# Patient Record
Sex: MALE | Race: WHITE | Employment: FULL TIME | URBAN - METROPOLITAN AREA
[De-identification: names, ages, dates, MRNs, and addresses within clinical notes are randomized per-mention and may not be internally consistent; named-entity substitution may affect disease eponyms.]

---

## 2019-12-03 ENCOUNTER — HOSPITAL ENCOUNTER (OUTPATIENT)
Dept: CT IMAGING | Age: 54
Discharge: HOME | End: 2019-12-03
Attending: ORTHOPAEDIC SURGERY

## 2019-12-03 ENCOUNTER — HOSPITAL ENCOUNTER (OUTPATIENT)
Dept: PREADMISSION TESTING | Age: 54
Discharge: HOME | End: 2019-12-03
Attending: ORTHOPAEDIC SURGERY

## 2019-12-03 VITALS
SYSTOLIC BLOOD PRESSURE: 150 MMHG | RESPIRATION RATE: 16 BRPM | HEART RATE: 103 BPM | HEIGHT: 72 IN | OXYGEN SATURATION: 98 % | BODY MASS INDEX: 32.22 KG/M2 | WEIGHT: 237.88 LBS | DIASTOLIC BLOOD PRESSURE: 71 MMHG | TEMPERATURE: 98.1 F

## 2019-12-03 DIAGNOSIS — R73.09 ELEVATED HEMOGLOBIN A1C: Primary | ICD-10-CM

## 2019-12-03 DIAGNOSIS — M25.551 RIGHT HIP PAIN: ICD-10-CM

## 2019-12-03 DIAGNOSIS — M16.11 PRIMARY OSTEOARTHRITIS OF RIGHT HIP: ICD-10-CM

## 2019-12-03 RX ORDER — METFORMIN HYDROCHLORIDE 1000 MG/1
1000 TABLET ORAL 2 TIMES DAILY WITH MEALS
COMMUNITY

## 2019-12-03 NOTE — PERIOP NOTES
Preventing Infections Before and After - Your Surgery    IMPORTANT INSTRUCTIONS    Please read and follow these instructions carefully. If you are unable to comply with the below instructions your procedure will be cancelled. Every Night for Three (3) nights before your surgery:  1. Shower with an antibacterial soap, such as Dial, or the soap provided at your preassessment appointment. A shower is better than a bath for cleaning your skin. 2. If needed, ask someone to help you reach all areas of your body. Dont forget to clean your belly button with every shower. The night before your surgery: If you lose your Hibiclens/chlorhexidine please contact surgery center or you can purchase it at a local pharmacy  1. On the night before your surgery, shower with an antibacterial soap, such as Dial, or the soap provided at your preassessment appointment. 2. With one packet of Hibiclens/Chlorhexidine in hand, turn water off.  3. Apply Hibiclens antiseptic skin cleanser with a clean, freshly washed washcloth. ? Gently apply to your body from chin to toes (except the genital area) and especially the area(s) where your incision(s) will be. ? Leave Hibiclens/Chlorhexidine on your skin for at least 20 seconds. CAUTION: If needed, Hibiclens/chlorhexidine may be used to clean the folds of skin of the legs (such as in the area of the groin) and on your buttocks and hips. However, do not use Hibiclens/Chlorhexidine above the neck or in the genital area (your bottom) or put inside any area of your body. 4. Turn the water back on and rinse. 5. Dry gently with a clean, freshly washed towel. 6. After your shower, do not use any powder, deodorant, perfumes or lotion. 7. Use clean, freshly washed towels and washcloths every time you shower. 8. Wear clean, freshly washed pajamas to bed the night before surgery. 9. Sleep on clean, freshly washed sheets. 10. Do not allow pets to sleep in your bed with you.     The Morning of your surgery:  1. Shower again thoroughly with an antibacterial soap, such as Dial or the soap provided at your preassessment appointment. If needed, ask someone for help to reach all areas of your body. Dont forget to clean your belly button! Rinse. 2. Dry gently with a clean, freshly washed towel. 3. After your shower, do not use any powder, deodorant, perfumes or lotion prior to surgery. 4. Put on clean, freshly washed clothing. Tips to help prevent infections after your surgery:  1. Protect your surgical wound from germs:  ? Hand washing is the most important thing you and your caregivers can do to prevent infections. ? Keep your bandage clean and dry! ? Do not touch your surgical wound. 2. Use clean, freshly washed towels and washcloths every time you shower; do not share bath linens with others. 3. Until your surgical wound is healed, wear clothing and sleep on bed linens each day that are clean and freshly washed. 4. Do not allow pets to sleep in your bed with you or touch your surgical wound. 5. Do not smoke - smoking delays wound healing. This may be a good time to stop smoking. 6. If you have diabetes, it is important for you to manage your blood sugar levels properly before your surgery as well as after your surgery. Poorly managed blood sugar levels slow down wound healing and prevent you from healing completely. If you lose your Hibiclens/chlorhexidine, please call the [facility], or it is available for purchase at your pharmacy.                ___________________      ___________________      ________________  (Signature of Patient)          (Witness)                   (Date and Time)

## 2019-12-03 NOTE — PERIOP NOTES

## 2019-12-03 NOTE — PERIOP NOTES
Called Dr Anne Bañuelos office and requested recent labs to be faxed. CBC, CMP, and HGB A1C results from 11/27/2019 from PCP's office. No EKG available. HGB A1C- 10.1 from 11/27/2019 at PCP's office. Informed Bessie Schrader and she came in and talked to patient and informed Nicky Esquivel at Dr Boyd Welsh. Per patient, he did not want to proceed to go to the joint replacement class at this time. Labs were discarded and never sent.

## 2019-12-03 NOTE — ADVANCED PRACTICE NURSE
A1C received from PCP office. A1C 10.1 on 11/27/19. States he is a known diabetic, treated by PCP, Dr. Rosanne Carr. Has not been evaluated by endocrinology. States his insurance company no longer covers his [place] [employer] and has not been able to afford it since then. He states he has tried alternate medications with palpitations resulting. Advised pt that surgery would likely be delayed due to A1C and that DTC consult would be ordered to optimize his glucose control. PC to Darshan Dunham, nurse for Dr. Stephanie Celestin, advising her of A1C results. States surgery would be delayed at this time. States she will contact CT to cancel pt's appt today and will reach out to pt this afternoon regarding further plan.      DTC consult ordered for A1C of 10.1 at PCP office

## 2021-06-04 ENCOUNTER — OFFICE VISIT (OUTPATIENT)
Dept: SURGERY | Age: 56
End: 2021-06-04
Payer: COMMERCIAL

## 2021-06-04 ENCOUNTER — TRANSCRIBE ORDER (OUTPATIENT)
Dept: SCHEDULING | Age: 56
End: 2021-06-04

## 2021-06-04 VITALS
HEIGHT: 72 IN | TEMPERATURE: 98.9 F | HEART RATE: 85 BPM | DIASTOLIC BLOOD PRESSURE: 82 MMHG | OXYGEN SATURATION: 99 % | SYSTOLIC BLOOD PRESSURE: 122 MMHG | RESPIRATION RATE: 18 BRPM | BODY MASS INDEX: 31.82 KG/M2 | WEIGHT: 234.9 LBS

## 2021-06-04 DIAGNOSIS — F17.210 CIGARETTE SMOKER: ICD-10-CM

## 2021-06-04 DIAGNOSIS — M54.2 CERVICALGIA: Primary | ICD-10-CM

## 2021-06-04 DIAGNOSIS — E04.1 LEFT THYROID NODULE: Primary | ICD-10-CM

## 2021-06-04 PROCEDURE — 99204 OFFICE O/P NEW MOD 45 MIN: CPT | Performed by: SURGERY

## 2021-06-04 RX ORDER — LISINOPRIL 5 MG/1
TABLET ORAL DAILY
COMMUNITY

## 2021-06-04 RX ORDER — ATORVASTATIN CALCIUM 40 MG/1
TABLET, FILM COATED ORAL DAILY
COMMUNITY

## 2021-06-04 NOTE — LETTER
6/4/2021 Patient: Connie Current YOB: 1965 Date of Visit: 6/4/2021 Clinton Jackman MD 
[address] [facility] [address] [address] Via Fax: [phone] [facility] [address] Via Fax: [phone] Dear MD Alicia Golsdtein MD, Thank you for referring Mr. Diony Cadet Sondra to [address] [place] [address] for evaluation. My notes for this consultation are attached. If you have questions, please do not hesitate to call me. I look forward to following your patient along with you.  
 
 
Sincerely, 
 
Oksana Landaverde MD

## 2021-06-04 NOTE — PROGRESS NOTES
1. Have you been to the ER, urgent care clinic since your last visit? Hospitalized since your last visit? No    2. Have you seen or consulted any other health care providers outside of the [address] [place] [address] since your last visit? Include any pap smears or colon screening.  No

## 2021-06-04 NOTE — PROGRESS NOTES
HISTORY OF PRESENT ILLNESS  Balbina Cabral is a 48 y.o. male who comes in for consultation by Hipolito Shultz MD for thyroid problems  HPI  He has noted increasing dysphagia over the last 3 years. He has problems swallowing meat and pills at times. He also feels like he is choking when he flexes his neck. He has a remote hx neck trauma from a tubing injury with four fractured cervical vertebra per the patient. In 2018 he had a CT of the neck noting a 5 cm left thyroid nodule and biopsy was recommended but never done. It also noted anterior cervical osteophytosis with some mass effect on the hypopharynx and C5-6 moderate canal stenosis. He denies arm paresthesias/weakness or voice changes, palpitations, unexplained weight loss/gain. Past Medical History:   Diagnosis Date    Diabetes ([address]     High cholesterol     Hypertension     Sleep apnea     no cpap     Past Surgical History:   Procedure Laterality Date    HX ORTHOPAEDIC  01/2021    Rt Hip replacement [facility]     Family History   Problem Relation Age of Onset    Diabetes Mother     Diabetes Father     Heart Disease Father      Social History     Tobacco Use    Smoking status: Current Every Day Smoker     Packs/day: 1.00     Years: 40.00     Pack years: 40.00    Smokeless tobacco: Never Used   Substance Use Topics    Alcohol use: Yes     Comment: occasionally    Drug use: Yes     Frequency: 3.0 times per week     Types: Marijuana     Current Outpatient Medications   Medication Sig    lisinopriL (PRINIVIL, ZESTRIL) 5 mg tablet Take  by mouth daily.  atorvastatin (LIPITOR) 40 mg tablet Take  by mouth daily.  dapagliflozin (Farxiga) 10 mg tab tablet Take  by mouth daily.  metFORMIN (GLUCOPHAGE) 1,000 mg tablet Take 1,000 mg by mouth two (2) times daily (with meals). No current facility-administered medications for this visit.      No Known Allergies    Review of Systems   Constitutional: Negative for chills, diaphoresis, fever, malaise/fatigue and weight loss. HENT: Negative for congestion, ear pain and sore throat. Eyes: Negative for blurred vision and pain. Respiratory: Negative for cough, hemoptysis, sputum production, shortness of breath, wheezing and stridor. Cardiovascular: Negative for chest pain, palpitations, orthopnea, claudication, leg swelling and PND. Gastrointestinal: Negative for abdominal pain, blood in stool, constipation, diarrhea, heartburn, melena, nausea and vomiting. Genitourinary: Negative for dysuria, flank pain, frequency, hematuria and urgency. Musculoskeletal: Positive for back pain and myalgias. Negative for joint pain and neck pain. Skin: Negative for itching and rash. Neurological: Positive for sensory change (peripheral neuropathy in feet). Negative for dizziness, tremors, focal weakness, seizures, weakness and headaches. Endo/Heme/Allergies: Negative for polydipsia. Psychiatric/Behavioral: Negative for depression and memory loss. The patient is not nervous/anxious. Visit Vitals  /82 (BP 1 Location: Left arm, BP Patient Position: Sitting, BP Cuff Size: Adult)   Pulse 85   Temp 98.9 °F (37.2 °C) (Oral)   Resp 18   Ht 6' (1.829 m)   Wt 106.5 kg (234 lb 14.4 oz)   SpO2 99%   BMI 31.86 kg/m²       Physical Exam  Constitutional:       General: He is not in acute distress. Appearance: Normal appearance. He is well-developed. He is not diaphoretic. HENT:      Head: Normocephalic and atraumatic. Mouth/Throat:      Pharynx: No oropharyngeal exudate. Eyes:      General: No scleral icterus. Conjunctiva/sclera: Conjunctivae normal.      Pupils: Pupils are equal, round, and reactive to light. Neck:      Thyroid: Thyroid mass present. No thyromegaly. Trachea: Trachea and phonation normal. No tracheal deviation. Cardiovascular:      Rate and Rhythm: Normal rate and regular rhythm. Heart sounds: Normal heart sounds. No murmur heard.    No friction rub. No gallop. Pulmonary:      Effort: Pulmonary effort is normal. No respiratory distress. Breath sounds: Normal breath sounds. No stridor. No wheezing or rales. Abdominal:      General: Bowel sounds are normal. There is no distension. Palpations: Abdomen is soft. There is no mass. Tenderness: There is no abdominal tenderness. There is no guarding or rebound. Hernia: No hernia is present. There is no hernia in the left inguinal area. Genitourinary:     Penis: Circumcised. Testes: Normal. Cremasteric reflex is present. Musculoskeletal:         General: No tenderness. Normal range of motion. Cervical back: Full passive range of motion without pain, normal range of motion and neck supple. Lymphadenopathy:      Cervical: No cervical adenopathy. Right cervical: No superficial or deep cervical adenopathy. Left cervical: No superficial or deep cervical adenopathy. Skin:     General: Skin is warm and dry. Findings: No erythema or rash. Neurological:      Mental Status: He is alert and oriented to person, place, and time. Cranial Nerves: No cranial nerve deficit. Coordination: Coordination normal.   Psychiatric:         Behavior: Behavior normal.         Thought Content: Thought content normal.         Judgment: Judgment normal.         ASSESSMENT and PLAN  1. Left thyroid nodule I explained about the anatomy and pathophysiology of thyroid nodules/disease. I explained about possible malignancy. I discussed FNA, observation, possible thyroid suppression pending FNA, and total thyroidectomy.   Risks of surgery include bleeding (potentially requiring emergent exploration at bedside), infection, parathyroid injury/removal requiring supplemental calcium +/- vit d, recurrent laryngeal/superior laryngeal nerve injury resulting in vocal cord paralysis, voice changes and fatigue, aspiration, further surgery, need for lifelong thyroid supplementation. Will get US of the neck to assess the thyroid   Consider US FNA based on results  2. Dysphagia. ?due to #1 or cervical osteophytes. Depending upon the thyroid findings would consider referral to neck/spine specialist  3. New lung nodule on CXR. He has seen Dr Gross Leisure   A CT of the chest is ordered but has not been done yet  4. NIDDM type 2 with peripheral neuropathy  On rx  5. Essential hypertension. Stable on rx  6. Hypercholesterolemia. On statin  7. Tobacco use   1ppd x 35 years.     Recommended cessation    Walter Boyce MD FACS

## 2021-06-17 ENCOUNTER — HOSPITAL ENCOUNTER (OUTPATIENT)
Dept: ULTRASOUND IMAGING | Age: 56
Discharge: HOME | End: 2021-06-17
Attending: SURGERY
Payer: COMMERCIAL

## 2021-06-17 ENCOUNTER — HOSPITAL ENCOUNTER (OUTPATIENT)
Dept: CT IMAGING | Age: 56
Discharge: HOME | End: 2021-06-17
Attending: INTERNAL MEDICINE
Payer: COMMERCIAL

## 2021-06-17 DIAGNOSIS — E04.1 LEFT THYROID NODULE: ICD-10-CM

## 2021-06-17 DIAGNOSIS — F17.210 CIGARETTE SMOKER: ICD-10-CM

## 2021-06-17 DIAGNOSIS — M54.2 CERVICALGIA: ICD-10-CM

## 2021-06-17 PROCEDURE — 76536 US EXAM OF HEAD AND NECK: CPT

## 2021-06-17 PROCEDURE — 71250 CT THORAX DX C-: CPT

## 2021-06-18 ENCOUNTER — TELEPHONE (OUTPATIENT)
Dept: SURGERY | Age: 56
End: 2021-06-18

## 2021-06-18 NOTE — TELEPHONE ENCOUNTER
I have made pt appt for 7- to come in for CT results. Pt needs something sooner and as late as poss.

## 2021-06-21 ENCOUNTER — TRANSCRIBE ORDER (OUTPATIENT)
Dept: SCHEDULING | Age: 56
End: 2021-06-21

## 2021-06-21 DIAGNOSIS — R91.1 NODULE OF UPPER LOBE OF LUNG: Primary | ICD-10-CM

## 2021-06-21 NOTE — TELEPHONE ENCOUNTER
US demonstrates 6.2 cm cystic/solid left thyroid nodule with calcifications    This needs US FNA left thyroid nodule    He also has a lung nodule with mediastinal lymph node that needs f/u by Dr Rey Morales his pulmonologist      Yen  Please reach out to him to schedule US FNA for 7/9    Alf Ley MD FACS

## 2021-07-09 ENCOUNTER — OFFICE VISIT (OUTPATIENT)
Dept: SURGERY | Age: 56
End: 2021-07-09
Payer: COMMERCIAL

## 2021-07-09 VITALS
HEIGHT: 72 IN | HEART RATE: 96 BPM | TEMPERATURE: 97.3 F | WEIGHT: 236 LBS | RESPIRATION RATE: 16 BRPM | BODY MASS INDEX: 31.97 KG/M2 | DIASTOLIC BLOOD PRESSURE: 84 MMHG | SYSTOLIC BLOOD PRESSURE: 134 MMHG

## 2021-07-09 DIAGNOSIS — E04.1 LEFT THYROID NODULE: ICD-10-CM

## 2021-07-09 PROCEDURE — 10005 FNA BX W/US GDN 1ST LES: CPT | Performed by: SURGERY

## 2021-07-09 NOTE — PROGRESS NOTES
Procedure Note    Pre Procedure Diagnosis:  Left thyroid complex nodule  Post Procedure Diagnosis:  Left thyroid complex nodule  Procedure:  1. Ultrasound guided FNA of left thyroid nodule  Surgeon:   Katelynn Harrison MD FACS  EBL minimal  SPECIMEN:  Left thyroid nodule    Procedure:    After informed consent and time out, I utilized a [id number] [address] with a high frequency linear array transducer and two 22 and two 25 gauge needle to perform four passes through a 2.5 x 2.4 x 5.2 cm complex cystic and solid left thyroid nodule. Specimens were placed in specialized containers from [facility]. He tolerated it well.         Signed  Katelynn Harrison MD FACS

## 2021-07-09 NOTE — PROGRESS NOTES
[facility]  OFFICE PROCEDURE PROGRESS NOTE        Chart reviewed for the following:   Lisa GONZALEZ, have reviewed the History, Physical and updated the Allergic reactions for [employer]     TIME OUT performed immediately prior to start of procedure:   Lisa GONZALEZ, have performed the following reviews on [employer] prior to the start of the procedure:            * Patient was identified by name and date of birth   * Agreement on procedure being performed was verified  * Risks and Benefits explained to the patient  * Procedure site verified and marked as necessary  * Patient was positioned for comfort  * Consent was signed and verified     Time: 5676      Date of procedure: 7/9/2021    Procedure performed by:  Irma Baez MD    Provider assisted by: Lisa Pineda LPN    Patient assisted by: Self    How tolerated by patient: well    Post Procedural Pain Scale: 0    Comments: None

## 2021-07-09 NOTE — LETTER
7/9/2021    Patient: Estuardo Hein   YOB: 1965   Date of Visit: 7/9/2021     Michelle Walls MD  [address] [facility] [address]  Via Fax: [address] [facility] [address]  Via Fax: [phone]    Dear MD Ike Garay MD,      Thank you for referring Mr. Solange Estrella Sondra to [address] [place] [address] for evaluation. My notes for this consultation are attached. If you have questions, please do not hesitate to call me. I look forward to following your patient along with you.       Sincerely,    Remy Grant MD

## 2021-07-09 NOTE — PROGRESS NOTES
Identified pt with two pt identifiers(name and ). Reviewed record in preparation for visit and have obtained necessary documentation. All patient medications has been reviewed. Chief Complaint   Patient presents with    Surgery     FNA Left thyroid       Health Maintenance Due   Topic    Hepatitis C Screening     Pneumococcal 0-64 years (1 of 2 - PPSV23)    Lipid Screen     COVID-19 Vaccine (1)    DTaP/Tdap/Td series (1 - Tdap)    Colorectal Cancer Screening Combo     Shingrix Vaccine Age 50> (1 of 2)    LDCT Smoker 30 Pack Years        Vitals:    21 1508   BP: 134/84   Pulse: 96   Resp: 16   Temp: 97.3 °F (36.3 °C)   TempSrc: Temporal   Weight: 107 kg (236 lb)   Height: 6' (1.829 m)   PainSc:   7   PainLoc: Generalized       4. Have you been to the ER, urgent care clinic since your last visit? Hospitalized since your last visit? No    5. Have you seen or consulted any other health care providers outside of the [address] [place] [address] since your last visit? Include any pap smears or colon screening. No      Patient is accompanied by self I have received verbal consent from Miko Mcelroy to discuss any/all medical information while they are present in the room.   e

## 2021-07-19 ENCOUNTER — TELEPHONE (OUTPATIENT)
Dept: SURGERY | Age: 56
End: 2021-07-19

## 2021-07-19 NOTE — TELEPHONE ENCOUNTER
Thyroid FNA  Afirma  POSITIVE  Papillary carcinoma with cystic features    He has an appointment 7/21 to discuss it    Cherly Oppenheim, MD FACS

## 2021-07-20 ENCOUNTER — HOSPITAL ENCOUNTER (OUTPATIENT)
Dept: PET IMAGING | Age: 56
Discharge: HOME | End: 2021-07-20
Attending: INTERNAL MEDICINE
Payer: COMMERCIAL

## 2021-07-20 VITALS — BODY MASS INDEX: 31.15 KG/M2 | WEIGHT: 230 LBS | HEIGHT: 72 IN

## 2021-07-20 DIAGNOSIS — R91.1 NODULE OF UPPER LOBE OF LUNG: ICD-10-CM

## 2021-07-20 LAB
GLUCOSE BLD STRIP.AUTO-MCNC: 192 MG/DL (ref 65–117)
SERVICE CMNT-IMP: ABNORMAL

## 2021-07-20 PROCEDURE — A9552 F18 FDG: HCPCS

## 2021-07-20 RX ORDER — SODIUM CHLORIDE 0.9 % (FLUSH) 0.9 %
10 SYRINGE (ML) INJECTION
Status: COMPLETED | OUTPATIENT
Start: 2021-07-20 | End: 2021-07-20

## 2021-07-20 RX ORDER — FLUDEOXYGLUCOSE F-18 200 MCI/ML
10 INJECTION INTRAVENOUS ONCE
Status: COMPLETED | OUTPATIENT
Start: 2021-07-20 | End: 2021-07-20

## 2021-07-20 RX ADMIN — Medication 10 ML: at 13:00

## 2021-07-20 RX ADMIN — FLUDEOXYGLUCOSE F-18 10 MILLICURIE: 200 INJECTION INTRAVENOUS at 13:00

## 2021-07-21 ENCOUNTER — OFFICE VISIT (OUTPATIENT)
Dept: SURGERY | Age: 56
End: 2021-07-21

## 2021-07-21 VITALS
OXYGEN SATURATION: 97 % | HEART RATE: 87 BPM | RESPIRATION RATE: 18 BRPM | WEIGHT: 235.6 LBS | DIASTOLIC BLOOD PRESSURE: 84 MMHG | SYSTOLIC BLOOD PRESSURE: 138 MMHG | TEMPERATURE: 97.5 F | HEIGHT: 72 IN | BODY MASS INDEX: 31.91 KG/M2

## 2021-07-21 DIAGNOSIS — C73 THYROID CANCER (HCC): Primary | ICD-10-CM

## 2021-07-21 PROCEDURE — 99215 OFFICE O/P EST HI 40 MIN: CPT | Performed by: SURGERY

## 2021-07-21 NOTE — PROGRESS NOTES
HISTORY OF PRESENT ILLNESS  Lela Osuna is a 55 y.o. male who comes in for follow up by Jim Vlila MD for thyroid problems  Thyroid Problem  Pertinent negatives include no chest pain, no abdominal pain, no headaches and no shortness of breath. He has noted increasing dysphagia over the last 3 years. He has problems swallowing meat and pills at times. He also feels like he is choking when he flexes his neck. He has a remote hx neck trauma from a tubing injury with four fractured cervical vertebra per the patient. In 2018 he had a CT of the neck noting a 5 cm left thyroid nodule and biopsy was recommended but never done. It also noted anterior cervical osteophytosis with some mass effect on the hypopharynx and C5-6 moderate canal stenosis. He denies arm paresthesias/weakness or voice changes, palpitations, unexplained weight loss/gain. FNA of the left thyroid nodule demonstrated papillary carcinoma with cystic features. Past Medical History:   Diagnosis Date    Diabetes ([address]     High cholesterol     Hypertension     Sleep apnea     no cpap     Past Surgical History:   Procedure Laterality Date    HX ORTHOPAEDIC  01/2021    Rt Hip replacement [facility]     Family History   Problem Relation Age of Onset    Diabetes Mother     Diabetes Father     Heart Disease Father      Social History     Tobacco Use    Smoking status: Current Every Day Smoker     Packs/day: 1.00     Years: 40.00     Pack years: 40.00    Smokeless tobacco: Never Used   Substance Use Topics    Alcohol use: Yes     Comment: occasionally    Drug use: Yes     Frequency: 3.0 times per week     Types: Marijuana     Current Outpatient Medications   Medication Sig    lisinopriL (PRINIVIL, ZESTRIL) 5 mg tablet Take  by mouth daily.  atorvastatin (LIPITOR) 40 mg tablet Take  by mouth daily.  dapagliflozin (Farxiga) 10 mg tab tablet Take  by mouth daily.     metFORMIN (GLUCOPHAGE) 1,000 mg tablet Take 1,000 mg by mouth two (2) times daily (with meals). No current facility-administered medications for this visit. No Known Allergies    Review of Systems   Constitutional: Negative for chills, diaphoresis, fever, malaise/fatigue and weight loss. HENT: Negative for congestion, ear pain and sore throat. Eyes: Negative for blurred vision and pain. Respiratory: Negative for cough, hemoptysis, sputum production, shortness of breath, wheezing and stridor. Cardiovascular: Negative for chest pain, palpitations, orthopnea, claudication, leg swelling and PND. Gastrointestinal: Negative for abdominal pain, blood in stool, constipation, diarrhea, heartburn, melena, nausea and vomiting. Genitourinary: Negative for dysuria, flank pain, frequency, hematuria and urgency. Musculoskeletal: Positive for back pain and myalgias. Negative for joint pain and neck pain. Skin: Negative for itching and rash. Neurological: Positive for sensory change (peripheral neuropathy in feet). Negative for dizziness, tremors, focal weakness, seizures, weakness and headaches. Endo/Heme/Allergies: Negative for polydipsia. Psychiatric/Behavioral: Negative for depression and memory loss. The patient is not nervous/anxious. Visit Vitals  /84 (BP 1 Location: Left arm, BP Patient Position: Sitting, BP Cuff Size: Large adult)   Pulse 87   Temp 97.5 °F (36.4 °C) (Temporal)   Resp 18   Ht 6' (1.829 m)   Wt 106.9 kg (235 lb 9.6 oz)   SpO2 97%   BMI 31.95 kg/m²       Physical Exam  Constitutional:       General: He is not in acute distress. Appearance: Normal appearance. He is well-developed. He is not diaphoretic. HENT:      Head: Normocephalic and atraumatic. Mouth/Throat:      Pharynx: No oropharyngeal exudate. Eyes:      General: No scleral icterus. Conjunctiva/sclera: Conjunctivae normal.      Pupils: Pupils are equal, round, and reactive to light. Neck:      Thyroid: Thyroid mass present.  No thyromegaly. Trachea: Trachea and phonation normal. No tracheal deviation. Cardiovascular:      Rate and Rhythm: Normal rate and regular rhythm. Heart sounds: Normal heart sounds. No murmur heard. No friction rub. No gallop. Pulmonary:      Effort: Pulmonary effort is normal. No respiratory distress. Breath sounds: Normal breath sounds. No stridor. No wheezing or rales. Abdominal:      General: Bowel sounds are normal. There is no distension. Palpations: Abdomen is soft. There is no mass. Tenderness: There is no abdominal tenderness. There is no guarding or rebound. Hernia: No hernia is present. There is no hernia in the left inguinal area. Genitourinary:     Penis: Circumcised. Testes: Normal. Cremasteric reflex is present. Musculoskeletal:         General: No tenderness. Normal range of motion. Cervical back: Full passive range of motion without pain, normal range of motion and neck supple. Lymphadenopathy:      Cervical: No cervical adenopathy. Right cervical: No superficial or deep cervical adenopathy. Left cervical: No superficial or deep cervical adenopathy. Skin:     General: Skin is warm and dry. Findings: No erythema or rash. Neurological:      Mental Status: He is alert and oriented to person, place, and time. Cranial Nerves: No cranial nerve deficit. Coordination: Coordination normal.   Psychiatric:         Behavior: Behavior normal.         Thought Content: Thought content normal.         Judgment: Judgment normal.         ASSESSMENT and PLAN  1. 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 I explained about the anatomy and pathophysiology of thyroid nodules/disease. I explained about possible malignancy. I discussed FNA, observation, possible thyroid suppression pending FNA, and thyroid lobectomy and total thyroidectomy with limited neck dissection.   Risks of surgery include bleeding (potentially requiring emergent exploration at bedside), infection, parathyroid injury/removal requiring supplemental calcium +/- vit d, recurrent laryngeal/superior laryngeal nerve injury resulting in vocal cord paralysis, voice changes and fatigue, aspiration, further surgery, need for lifelong thyroid supplementation. 2.  Dysphagia. ?due to #1 or cervical osteophytes. Depending upon the thyroid findings would consider referral to neck/spine specialist  3. New lung nodule on CXR. He has seen Dr Elfrieda Goodell   PET done 7/20/2021 was low uptake and likely plan for close follow up but has not seen him in follow up as yet    4. NIDDM type 2 with peripheral neuropathy  On rx  5. Essential hypertension. Stable on rx  6. Hypercholesterolemia. On statin  7. Tobacco use   1ppd x 35 years.     Recommended cessation    He desires a total thyroidectomy with limited central neck dissection under general anesthesia with a NIM tube as an outpatient with an overnight stay    Renée Dobbs MD FACS

## 2021-07-21 NOTE — PROGRESS NOTES
Chief Complaint   Patient presents with    Thyroid Problem     f/u for CT results       1. Have you been to the ER, urgent care clinic since your last visit? Hospitalized since your last visit? No    2. Have you seen or consulted any other health care providers outside of the [address] [place] [address] since your last visit? Include any pap smears or colon screening.  No

## 2021-08-13 ENCOUNTER — TRANSCRIBE ORDER (OUTPATIENT)
Dept: SCHEDULING | Age: 56
End: 2021-08-13

## 2021-08-13 DIAGNOSIS — R91.1 NODULE OF UPPER LOBE OF LUNG: Primary | ICD-10-CM

## 2021-08-30 ENCOUNTER — HOSPITAL ENCOUNTER (OUTPATIENT)
Dept: PREADMISSION TESTING | Age: 56
Discharge: HOME | End: 2021-08-30
Attending: SURGERY
Payer: COMMERCIAL

## 2021-08-30 VITALS
HEART RATE: 77 BPM | DIASTOLIC BLOOD PRESSURE: 64 MMHG | OXYGEN SATURATION: 96 % | RESPIRATION RATE: 18 BRPM | HEIGHT: 72 IN | TEMPERATURE: 98.8 F | SYSTOLIC BLOOD PRESSURE: 127 MMHG | BODY MASS INDEX: 32.13 KG/M2 | WEIGHT: 237.22 LBS

## 2021-08-30 LAB
ERYTHROCYTE [DISTWIDTH] IN BLOOD BY AUTOMATED COUNT: 13.5 % (ref 11.5–14.5)
HCT VFR BLD AUTO: 42.7 % (ref 36.6–50.3)
HGB BLD-MCNC: 14.3 G/DL (ref 12.1–17)
MCH RBC QN AUTO: 29.7 PG (ref 26–34)
MCHC RBC AUTO-ENTMCNC: 33.5 G/DL (ref 30–36.5)
MCV RBC AUTO: 88.6 FL (ref 80–99)
NRBC # BLD: 0 K/UL (ref 0–0.01)
NRBC BLD-RTO: 0 PER 100 WBC
PLATELET # BLD AUTO: 294 K/UL (ref 150–400)
PMV BLD AUTO: 9.4 FL (ref 8.9–12.9)
RBC # BLD AUTO: 4.82 M/UL (ref 4.1–5.7)
WBC # BLD AUTO: 9.2 K/UL (ref 4.1–11.1)

## 2021-08-30 PROCEDURE — U0005 INFEC AGEN DETEC AMPLI PROBE: HCPCS

## 2021-08-30 PROCEDURE — 85027 COMPLETE CBC AUTOMATED: CPT

## 2021-08-30 PROCEDURE — 36415 COLL VENOUS BLD VENIPUNCTURE: CPT

## 2021-08-30 NOTE — PERIOP NOTES
[facility]  Preoperative Instructions        Surgery Date Wed., 9/1/21          Time of Arrival 5:45am, [facility] @ [phone]    1. On the day of your surgery, please report to the Surgical Services Registration Desk and sign in at your designated time. The Surgery Center is located to the right of the Emergency Room. 2. You must have someone with you to drive you home. You should not drive a car for 24 hours following surgery. Please make arrangements for a friend or family member to stay with you for the first 24 hours after your surgery. 3. Do not have anything to eat or drink (including water, gum, mints, coffee, juice) after midnight Tue., 8/31/21. ? This may not apply to medications prescribed by your physician. ?(Please note below the special instructions with medications to take the morning of your procedure.)    4. We recommend you do not drink any alcoholic beverages for 24 hours before and after your surgery. 5. Contact your surgeons office for instructions on the following medications: non-steroidal anti-inflammatory drugs (i.e. Advil, Aleve), vitamins, and supplements. (Some surgeons will want you to stop these medications prior to surgery and others may allow you to take them)  **If you are currently taking Plavix, Coumadin, Aspirin and/or other blood-thinning agents, contact your surgeon for instructions. ** Your surgeon will partner with the physician prescribing these medications to determine if it is safe to stop or if you need to continue taking. Please do not stop taking these medications without instructions from your surgeon    6. Wear comfortable clothes. Wear glasses instead of contacts. Do not bring any money or jewelry. Please bring picture ID, insurance card, and any prearranged co-payment or hospital payment. Do not wear make-up, particularly mascara the morning of your surgery.   Do not wear nail polish, particularly if you are having foot /hand surgery. Wear your hair loose or down, no ponytails, buns, chantale pins or clips. All body piercings must be removed. Please shower with antibacterial soap for three consecutive days before and on the morning of surgery, but do not apply any lotions, powders or deodorants after the shower on the day of surgery. Please use a fresh towels after each shower. Please sleep in clean clothes and change bed linens the night before surgery. Please do not shave for 48 hours prior to surgery. Shaving of the face is acceptable. 7. You should understand that if you do not follow these instructions your surgery may be cancelled. If your physical condition changes (I.e. fever, cold or flu) please contact your surgeon as soon as possible. 8. It is important that you be on time. If a situation occurs where you may be late, please call [phone] (OR Holding Area). 9. If you have any questions and or problems, please call [phone] (Pre-admission Testing). 10. Your surgery time may be subject to change. You will receive a phone call the evening prior if your time changes. 11.  If having outpatient surgery, you must have someone to drive you here, stay with you during the duration of your stay, and to drive you home at time of discharge. Special Instructions: Follow your physician/surgeon instructions for holding all non-steroidal anti-inflammatory drugs/NSAIDs, blood-thinning agents, vitamins & supplements prior to surgery. TAKE ALL MEDICATIONS DAY OF SURGERY EXCEPT: Delphia Damon or metformin      I understand a pre-operative phone call will be made to verify my surgery time. In the event that I am not available, I give permission for a message to be left on my answering service and/or with another person?   yes         ___________________      __________   _________    (Signature of Patient)             (Witness)                (Date and Time)

## 2021-08-30 NOTE — PERIOP NOTES
Incentive Spirometer        Using the incentive spirometer helps expand the small air sacs of your lungs, helps you breathe deeply, and helps improve your lung function. Use your incentive spirometer twice a day (10 breaths each time) prior to surgery. How to Use Your Incentive Spirometer:  1. Hold the incentive spirometer in an upright position. 2. Breathe out as usual.   3. Place the mouthpiece in your mouth and seal your lips tightly around it. 4. Take a deep breath. Breathe in slowly and as deeply as possible. Keep the blue flow rate guide between the arrows. 5. Hold your breath as long as possible. Then exhale slowly and allow the piston to fall to the bottom of the column. 6. Rest for a few seconds and repeat steps one through five at least 10 times. PAT Tidal Volume_____2500_______  x________2________  Date__________8/30/21_____________    Valarie Ritchie THE INCENTIVE SPIROMETER WITH YOU TO THE HOSPITAL ON THE DAY OF YOUR SURGERY. Opportunity given to ask and answer questions as well as to observe return demonstration.       Patient signature_____________________________          Witness____________________________

## 2021-08-31 ENCOUNTER — ANESTHESIA EVENT (OUTPATIENT)
Dept: SURGERY | Age: 56
End: 2021-08-31
Payer: COMMERCIAL

## 2021-08-31 LAB
SARS-COV-2, XPLCVT: NOT DETECTED
SOURCE, COVRS: NORMAL

## 2021-09-01 ENCOUNTER — ANESTHESIA (OUTPATIENT)
Dept: SURGERY | Age: 56
End: 2021-09-01
Payer: COMMERCIAL

## 2021-09-01 ENCOUNTER — HOSPITAL ENCOUNTER (OUTPATIENT)
Age: 56
Discharge: HOME | End: 2021-09-02
Attending: SURGERY | Admitting: SURGERY
Payer: COMMERCIAL

## 2021-09-01 DIAGNOSIS — C73 THYROID CANCER (HCC): Primary | ICD-10-CM

## 2021-09-01 LAB
CALCIUM SERPL-MCNC: 8.7 MG/DL (ref 8.5–10.1)
GLUCOSE BLD STRIP.AUTO-MCNC: 187 MG/DL (ref 65–117)
GLUCOSE BLD STRIP.AUTO-MCNC: 213 MG/DL (ref 65–117)
GLUCOSE BLD STRIP.AUTO-MCNC: 227 MG/DL (ref 65–117)
GLUCOSE BLD STRIP.AUTO-MCNC: 244 MG/DL (ref 65–117)
SERVICE CMNT-IMP: ABNORMAL

## 2021-09-01 PROCEDURE — 77030010938 HC CLP LIG TELE -A: Performed by: SURGERY

## 2021-09-01 PROCEDURE — 36415 COLL VENOUS BLD VENIPUNCTURE: CPT

## 2021-09-01 PROCEDURE — 99218 HC RM OBSERVATION: CPT

## 2021-09-01 PROCEDURE — 74011000250 HC RX REV CODE- 250: Performed by: NURSE ANESTHETIST, CERTIFIED REGISTERED

## 2021-09-01 PROCEDURE — 2709999900 HC NON-CHARGEABLE SUPPLY: Performed by: SURGERY

## 2021-09-01 PROCEDURE — 77030018390 HC SPNG HEMSTAT2 J&J -B: Performed by: SURGERY

## 2021-09-01 PROCEDURE — 74011250637 HC RX REV CODE- 250/637: Performed by: SURGERY

## 2021-09-01 PROCEDURE — 74011250636 HC RX REV CODE- 250/636: Performed by: ANESTHESIOLOGY

## 2021-09-01 PROCEDURE — 76060000036 HC ANESTHESIA 2.5 TO 3 HR: Performed by: SURGERY

## 2021-09-01 PROCEDURE — 77030021678 HC GLIDESCP STAT DISP VERT -B: Performed by: ANESTHESIOLOGY

## 2021-09-01 PROCEDURE — 77030026438 HC STYL ET INTUB CARD -A: Performed by: ANESTHESIOLOGY

## 2021-09-01 PROCEDURE — 76010000132 HC OR TIME 2.5 TO 3 HR: Performed by: SURGERY

## 2021-09-01 PROCEDURE — 74011636637 HC RX REV CODE- 636/637

## 2021-09-01 PROCEDURE — 82962 GLUCOSE BLOOD TEST: CPT

## 2021-09-01 PROCEDURE — 88307 TISSUE EXAM BY PATHOLOGIST: CPT

## 2021-09-01 PROCEDURE — 74011250636 HC RX REV CODE- 250/636: Performed by: SURGERY

## 2021-09-01 PROCEDURE — 77030002996 HC SUT SLK J&J -A: Performed by: SURGERY

## 2021-09-01 PROCEDURE — 76210000005 HC OR PH I REC 5 TO 5.5 HR: Performed by: SURGERY

## 2021-09-01 PROCEDURE — 77030008698 HC TU ET REINF MEDT -D: Performed by: ANESTHESIOLOGY

## 2021-09-01 PROCEDURE — 88305 TISSUE EXAM BY PATHOLOGIST: CPT

## 2021-09-01 PROCEDURE — 60252 REMOVAL OF THYROID: CPT | Performed by: SURGERY

## 2021-09-01 PROCEDURE — 74011250636 HC RX REV CODE- 250/636: Performed by: NURSE ANESTHETIST, CERTIFIED REGISTERED

## 2021-09-01 PROCEDURE — 77030040361 HC SLV COMPR DVT MDII -B: Performed by: SURGERY

## 2021-09-01 PROCEDURE — 77030010507 HC ADH SKN DERMBND J&J -B: Performed by: SURGERY

## 2021-09-01 PROCEDURE — 77030031139 HC SUT VCRL2 J&J -A: Performed by: SURGERY

## 2021-09-01 PROCEDURE — 77030019908 HC STETH ESOPH SIMS -A: Performed by: ANESTHESIOLOGY

## 2021-09-01 PROCEDURE — 82310 ASSAY OF CALCIUM: CPT

## 2021-09-01 PROCEDURE — 74011000250 HC RX REV CODE- 250: Performed by: SURGERY

## 2021-09-01 PROCEDURE — 77030011267 HC ELECTRD BLD COVD -A: Performed by: SURGERY

## 2021-09-01 RX ORDER — ATORVASTATIN CALCIUM 40 MG/1
40 TABLET, FILM COATED ORAL DAILY
Status: DISCONTINUED | OUTPATIENT
Start: 2021-09-01 | End: 2021-09-02 | Stop reason: HOSPADM

## 2021-09-01 RX ORDER — FENTANYL CITRATE 50 UG/ML
INJECTION, SOLUTION INTRAMUSCULAR; INTRAVENOUS AS NEEDED
Status: DISCONTINUED | OUTPATIENT
Start: 2021-09-01 | End: 2021-09-01 | Stop reason: HOSPADM

## 2021-09-01 RX ORDER — MORPHINE SULFATE 2 MG/ML
1-2 INJECTION, SOLUTION INTRAMUSCULAR; INTRAVENOUS
Status: DISCONTINUED | OUTPATIENT
Start: 2021-09-01 | End: 2021-09-02 | Stop reason: HOSPADM

## 2021-09-01 RX ORDER — SODIUM CHLORIDE 0.9 % (FLUSH) 0.9 %
5-40 SYRINGE (ML) INJECTION EVERY 8 HOURS
Status: CANCELLED | OUTPATIENT
Start: 2021-09-01

## 2021-09-01 RX ORDER — SODIUM CHLORIDE 0.9 % (FLUSH) 0.9 %
5-40 SYRINGE (ML) INJECTION AS NEEDED
Status: DISCONTINUED | OUTPATIENT
Start: 2021-09-01 | End: 2021-09-01 | Stop reason: HOSPADM

## 2021-09-01 RX ORDER — ONDANSETRON 2 MG/ML
INJECTION INTRAMUSCULAR; INTRAVENOUS AS NEEDED
Status: DISCONTINUED | OUTPATIENT
Start: 2021-09-01 | End: 2021-09-01 | Stop reason: HOSPADM

## 2021-09-01 RX ORDER — FENTANYL CITRATE 50 UG/ML
25 INJECTION, SOLUTION INTRAMUSCULAR; INTRAVENOUS
Status: COMPLETED | OUTPATIENT
Start: 2021-09-01 | End: 2021-09-01

## 2021-09-01 RX ORDER — SUCCINYLCHOLINE CHLORIDE 20 MG/ML
INJECTION INTRAMUSCULAR; INTRAVENOUS AS NEEDED
Status: DISCONTINUED | OUTPATIENT
Start: 2021-09-01 | End: 2021-09-01 | Stop reason: HOSPADM

## 2021-09-01 RX ORDER — PROPOFOL 10 MG/ML
INJECTION, EMULSION INTRAVENOUS AS NEEDED
Status: DISCONTINUED | OUTPATIENT
Start: 2021-09-01 | End: 2021-09-01 | Stop reason: HOSPADM

## 2021-09-01 RX ORDER — PHENYLEPHRINE HCL IN 0.9% NACL 0.4MG/10ML
SYRINGE (ML) INTRAVENOUS
Status: DISCONTINUED | OUTPATIENT
Start: 2021-09-01 | End: 2021-09-01 | Stop reason: HOSPADM

## 2021-09-01 RX ORDER — MAGNESIUM SULFATE 100 %
4 CRYSTALS MISCELLANEOUS AS NEEDED
Status: DISCONTINUED | OUTPATIENT
Start: 2021-09-01 | End: 2021-09-02 | Stop reason: HOSPADM

## 2021-09-01 RX ORDER — OXYCODONE AND ACETAMINOPHEN 5; 325 MG/1; MG/1
1 TABLET ORAL
Qty: 15 TABLET | Refills: 0 | Status: SHIPPED | OUTPATIENT
Start: 2021-09-01 | End: 2021-09-06

## 2021-09-01 RX ORDER — BUPIVACAINE HYDROCHLORIDE AND EPINEPHRINE 5; 5 MG/ML; UG/ML
INJECTION, SOLUTION EPIDURAL; INTRACAUDAL; PERINEURAL AS NEEDED
Status: DISCONTINUED | OUTPATIENT
Start: 2021-09-01 | End: 2021-09-01 | Stop reason: HOSPADM

## 2021-09-01 RX ORDER — METFORMIN HYDROCHLORIDE 500 MG/1
1000 TABLET ORAL 2 TIMES DAILY WITH MEALS
Status: DISCONTINUED | OUTPATIENT
Start: 2021-09-02 | End: 2021-09-02 | Stop reason: HOSPADM

## 2021-09-01 RX ORDER — LEVOTHYROXINE SODIUM 125 UG/1
125 TABLET ORAL
Status: DISCONTINUED | OUTPATIENT
Start: 2021-09-02 | End: 2021-09-02 | Stop reason: HOSPADM

## 2021-09-01 RX ORDER — SODIUM CHLORIDE, SODIUM LACTATE, POTASSIUM CHLORIDE, CALCIUM CHLORIDE 600; 310; 30; 20 MG/100ML; MG/100ML; MG/100ML; MG/100ML
25 INJECTION, SOLUTION INTRAVENOUS CONTINUOUS
Status: DISCONTINUED | OUTPATIENT
Start: 2021-09-01 | End: 2021-09-01 | Stop reason: HOSPADM

## 2021-09-01 RX ORDER — DEXTROSE 50 % IN WATER (D50W) INTRAVENOUS SYRINGE
25-50 AS NEEDED
Status: DISCONTINUED | OUTPATIENT
Start: 2021-09-01 | End: 2021-09-02 | Stop reason: HOSPADM

## 2021-09-01 RX ORDER — SODIUM CHLORIDE 0.9 % (FLUSH) 0.9 %
5-40 SYRINGE (ML) INJECTION EVERY 8 HOURS
Status: DISCONTINUED | OUTPATIENT
Start: 2021-09-01 | End: 2021-09-01 | Stop reason: HOSPADM

## 2021-09-01 RX ORDER — ROCURONIUM BROMIDE 10 MG/ML
INJECTION, SOLUTION INTRAVENOUS AS NEEDED
Status: DISCONTINUED | OUTPATIENT
Start: 2021-09-01 | End: 2021-09-01 | Stop reason: HOSPADM

## 2021-09-01 RX ORDER — OXYCODONE HYDROCHLORIDE 5 MG/1
5 TABLET ORAL
Status: DISCONTINUED | OUTPATIENT
Start: 2021-09-01 | End: 2021-09-02 | Stop reason: HOSPADM

## 2021-09-01 RX ORDER — SODIUM CHLORIDE 0.9 % (FLUSH) 0.9 %
5-40 SYRINGE (ML) INJECTION AS NEEDED
Status: CANCELLED | OUTPATIENT
Start: 2021-09-01

## 2021-09-01 RX ORDER — SODIUM CHLORIDE 0.9 % (FLUSH) 0.9 %
5-40 SYRINGE (ML) INJECTION EVERY 8 HOURS
Status: DISCONTINUED | OUTPATIENT
Start: 2021-09-01 | End: 2021-09-02 | Stop reason: HOSPADM

## 2021-09-01 RX ORDER — ONDANSETRON 2 MG/ML
4 INJECTION INTRAMUSCULAR; INTRAVENOUS AS NEEDED
Status: DISCONTINUED | OUTPATIENT
Start: 2021-09-01 | End: 2021-09-01 | Stop reason: HOSPADM

## 2021-09-01 RX ORDER — LIDOCAINE HYDROCHLORIDE 20 MG/ML
INJECTION, SOLUTION EPIDURAL; INFILTRATION; INTRACAUDAL; PERINEURAL AS NEEDED
Status: DISCONTINUED | OUTPATIENT
Start: 2021-09-01 | End: 2021-09-01 | Stop reason: HOSPADM

## 2021-09-01 RX ORDER — DEXMEDETOMIDINE HYDROCHLORIDE 100 UG/ML
INJECTION, SOLUTION INTRAVENOUS AS NEEDED
Status: DISCONTINUED | OUTPATIENT
Start: 2021-09-01 | End: 2021-09-01 | Stop reason: HOSPADM

## 2021-09-01 RX ORDER — SODIUM CHLORIDE 0.9 % (FLUSH) 0.9 %
5-40 SYRINGE (ML) INJECTION AS NEEDED
Status: DISCONTINUED | OUTPATIENT
Start: 2021-09-01 | End: 2021-09-02 | Stop reason: HOSPADM

## 2021-09-01 RX ORDER — LEVOTHYROXINE SODIUM 150 UG/1
150 TABLET ORAL
Qty: 30 TABLET | Refills: 1 | Status: SHIPPED | OUTPATIENT
Start: 2021-09-01 | End: 2021-10-01

## 2021-09-01 RX ORDER — DEXTROSE, SODIUM CHLORIDE, AND POTASSIUM CHLORIDE 5; .45; .15 G/100ML; G/100ML; G/100ML
25 INJECTION INTRAVENOUS CONTINUOUS
Status: DISCONTINUED | OUTPATIENT
Start: 2021-09-01 | End: 2021-09-02 | Stop reason: HOSPADM

## 2021-09-01 RX ORDER — MIDAZOLAM HYDROCHLORIDE 1 MG/ML
INJECTION, SOLUTION INTRAMUSCULAR; INTRAVENOUS AS NEEDED
Status: DISCONTINUED | OUTPATIENT
Start: 2021-09-01 | End: 2021-09-01 | Stop reason: HOSPADM

## 2021-09-01 RX ORDER — NALOXONE HYDROCHLORIDE 0.4 MG/ML
0.2 INJECTION, SOLUTION INTRAMUSCULAR; INTRAVENOUS; SUBCUTANEOUS
Status: DISCONTINUED | OUTPATIENT
Start: 2021-09-01 | End: 2021-09-02 | Stop reason: HOSPADM

## 2021-09-01 RX ORDER — DEXAMETHASONE SODIUM PHOSPHATE 4 MG/ML
INJECTION, SOLUTION INTRA-ARTICULAR; INTRALESIONAL; INTRAMUSCULAR; INTRAVENOUS; SOFT TISSUE AS NEEDED
Status: DISCONTINUED | OUTPATIENT
Start: 2021-09-01 | End: 2021-09-01 | Stop reason: HOSPADM

## 2021-09-01 RX ORDER — LISINOPRIL 5 MG/1
5 TABLET ORAL DAILY
Status: DISCONTINUED | OUTPATIENT
Start: 2021-09-01 | End: 2021-09-02 | Stop reason: HOSPADM

## 2021-09-01 RX ORDER — ONDANSETRON 4 MG/1
4 TABLET, ORALLY DISINTEGRATING ORAL
Status: DISCONTINUED | OUTPATIENT
Start: 2021-09-01 | End: 2021-09-02 | Stop reason: HOSPADM

## 2021-09-01 RX ORDER — GLYCOPYRROLATE 0.2 MG/ML
INJECTION INTRAMUSCULAR; INTRAVENOUS AS NEEDED
Status: DISCONTINUED | OUTPATIENT
Start: 2021-09-01 | End: 2021-09-01 | Stop reason: HOSPADM

## 2021-09-01 RX ORDER — INSULIN LISPRO 100 [IU]/ML
INJECTION, SOLUTION INTRAVENOUS; SUBCUTANEOUS
Status: DISCONTINUED | OUTPATIENT
Start: 2021-09-01 | End: 2021-09-02 | Stop reason: HOSPADM

## 2021-09-01 RX ADMIN — Medication 10 MCG/MIN: at 08:00

## 2021-09-01 RX ADMIN — OXYCODONE 5 MG: 5 TABLET ORAL at 14:34

## 2021-09-01 RX ADMIN — Medication 1 AMPULE: at 17:20

## 2021-09-01 RX ADMIN — GLYCOPYRROLATE 0.2 MG: 0.2 INJECTION, SOLUTION INTRAMUSCULAR; INTRAVENOUS at 07:25

## 2021-09-01 RX ADMIN — OXYCODONE 5 MG: 5 TABLET ORAL at 20:25

## 2021-09-01 RX ADMIN — WATER 2 G: 1 INJECTION INTRAMUSCULAR; INTRAVENOUS; SUBCUTANEOUS at 07:49

## 2021-09-01 RX ADMIN — POTASSIUM CHLORIDE, DEXTROSE MONOHYDRATE AND SODIUM CHLORIDE 25 ML/HR: 150; 5; 450 INJECTION, SOLUTION INTRAVENOUS at 11:21

## 2021-09-01 RX ADMIN — ONDANSETRON HYDROCHLORIDE 4 MG: 2 INJECTION, SOLUTION INTRAMUSCULAR; INTRAVENOUS at 09:49

## 2021-09-01 RX ADMIN — DEXAMETHASONE SODIUM PHOSPHATE 12 MG: 4 INJECTION, SOLUTION INTRAMUSCULAR; INTRAVENOUS at 08:00

## 2021-09-01 RX ADMIN — FENTANYL CITRATE 25 MCG: 50 INJECTION, SOLUTION INTRAMUSCULAR; INTRAVENOUS at 10:44

## 2021-09-01 RX ADMIN — SUCCINYLCHOLINE CHLORIDE 200 MG: 20 INJECTION, SOLUTION INTRAMUSCULAR; INTRAVENOUS at 07:38

## 2021-09-01 RX ADMIN — Medication 10 ML: at 22:00

## 2021-09-01 RX ADMIN — Medication 15 MCG/MIN: at 08:14

## 2021-09-01 RX ADMIN — SODIUM CHLORIDE, POTASSIUM CHLORIDE, SODIUM LACTATE AND CALCIUM CHLORIDE 25 ML/HR: 600; 310; 30; 20 INJECTION, SOLUTION INTRAVENOUS at 06:32

## 2021-09-01 RX ADMIN — LIDOCAINE HYDROCHLORIDE 100 MG: 20 INJECTION, SOLUTION INTRAVENOUS at 07:38

## 2021-09-01 RX ADMIN — Medication 50 MCG/MIN: at 07:49

## 2021-09-01 RX ADMIN — MORPHINE SULFATE 1 MG: 2 INJECTION, SOLUTION INTRAMUSCULAR; INTRAVENOUS at 16:17

## 2021-09-01 RX ADMIN — FENTANYL CITRATE 25 MCG: 50 INJECTION, SOLUTION INTRAMUSCULAR; INTRAVENOUS at 10:32

## 2021-09-01 RX ADMIN — DEXMEDETOMIDINE HYDROCHLORIDE 6 MCG: 100 INJECTION, SOLUTION, CONCENTRATE INTRAVENOUS at 08:56

## 2021-09-01 RX ADMIN — Medication 3 AMPULE: at 06:32

## 2021-09-01 RX ADMIN — MORPHINE SULFATE 1 MG: 2 INJECTION, SOLUTION INTRAMUSCULAR; INTRAVENOUS at 11:17

## 2021-09-01 RX ADMIN — HUMAN INSULIN 2 UNITS: 100 INJECTION, SOLUTION SUBCUTANEOUS at 10:29

## 2021-09-01 RX ADMIN — FENTANYL CITRATE 25 MCG: 50 INJECTION, SOLUTION INTRAMUSCULAR; INTRAVENOUS at 11:06

## 2021-09-01 RX ADMIN — Medication 1 AMPULE: at 20:25

## 2021-09-01 RX ADMIN — MIDAZOLAM HYDROCHLORIDE 2 MG: 1 INJECTION, SOLUTION INTRAMUSCULAR; INTRAVENOUS at 07:25

## 2021-09-01 RX ADMIN — PROPOFOL 100 MG: 10 INJECTION, EMULSION INTRAVENOUS at 07:41

## 2021-09-01 RX ADMIN — DEXMEDETOMIDINE HYDROCHLORIDE 6 MCG: 100 INJECTION, SOLUTION, CONCENTRATE INTRAVENOUS at 08:18

## 2021-09-01 RX ADMIN — ROCURONIUM BROMIDE 10 MG: 10 INJECTION INTRAVENOUS at 07:38

## 2021-09-01 RX ADMIN — PROPOFOL 200 MG: 10 INJECTION, EMULSION INTRAVENOUS at 07:38

## 2021-09-01 RX ADMIN — FENTANYL CITRATE 50 MCG: 50 INJECTION, SOLUTION INTRAMUSCULAR; INTRAVENOUS at 07:45

## 2021-09-01 RX ADMIN — PROPOFOL 50 MG: 10 INJECTION, EMULSION INTRAVENOUS at 08:18

## 2021-09-01 RX ADMIN — FENTANYL CITRATE 25 MCG: 50 INJECTION, SOLUTION INTRAMUSCULAR; INTRAVENOUS at 10:58

## 2021-09-01 RX ADMIN — FENTANYL CITRATE 50 MCG: 50 INJECTION, SOLUTION INTRAMUSCULAR; INTRAVENOUS at 08:00

## 2021-09-01 NOTE — DISCHARGE INSTRUCTIONS
Discharge Instructions:  Thyroidectomy    Dr. Miriam Walter    Call for appointment for follow up in 1 week [phone]    Activity:    Walk regularly. You may resume driving in 24 hours unless still requiring narcotics for pain. Work:    You may return to work in 11 - 7 days to light activity. No lifting more than 10 pounds for one week. Diet:    You may resume normal diet after 24 hours. Anesthesia and narcotics may cause nausea and vomiting. If persistent please call the office. Call if you have numbness or tingling around lips or fingertips as this is a sign of low calcium. Wound Care: You have a special dressing called Dermabond. It is okay to shower and let the water run over the incision but do not scrub the area or soak in a tub. If you have a small amount of drainage you may place a dry bandage over the wound and change it daily. If you experience a lot of drainage, develop redness around the wound, or a fever over 101 F occurs please call the office. Medications:    Resume home medications as indicated on the Medical Reconciliation form. Aspirin, Coumadin, and Plavix can be restarted on post operative day 2 if you were taking them preoperatively. Pain medications:  Non steroidal antiinflammatories seem to work best for post surgical pain. Try these first as prescribed. A narcotic prescription will also be given for breakthrough pain. Over the counter stool softeners and laxatives may be used if needed. Do not hesitate to call with questions or concerns.

## 2021-09-01 NOTE — PROGRESS NOTES
TRANSFER - IN REPORT:    Verbal report received from Pancho Leiva RN (name) on Estevan Larose  being received from PACU (unit) for routine post - op      Report consisted of patients Situation, Background, Assessment and   Recommendations(SBAR). Information from the following report(s) SBAR, Kardex, Intake/Output and MAR was reviewed with the receiving nurse. Opportunity for questions and clarification was provided. Assessment completed upon patients arrival to unit and care assumed.

## 2021-09-01 NOTE — H&P
HISTORY OF PRESENT ILLNESS  Maria A Man is a 55 y.o. male who comes in for follow up by Tae Alvarado MD for thyroid problems  Thyroid Problem  Pertinent negatives include no chest pain, no abdominal pain, no headaches and no shortness of breath.      He has noted increasing dysphagia over the last 3 years. He has problems swallowing meat and pills at times. He also feels like he is choking when he flexes his neck. He has a remote hx neck trauma from a tubing injury with four fractured cervical vertebra per the patient. In 2018 he had a CT of the neck noting a 5 cm left thyroid nodule and biopsy was recommended but never done. It also noted anterior cervical osteophytosis with some mass effect on the hypopharynx and C5-6 moderate canal stenosis. He denies arm paresthesias/weakness or voice changes, palpitations, unexplained weight loss/gain. FNA of the left thyroid nodule demonstrated papillary carcinoma with cystic features.          Past Medical History:   Diagnosis Date    Diabetes ([address]      High cholesterol      Hypertension      Sleep apnea       no cpap            Past Surgical History:   Procedure Laterality Date    HX ORTHOPAEDIC   01/2021     Rt Hip replacement [facility]            Family History   Problem Relation Age of Onset    Diabetes Mother      Diabetes Father      Heart Disease Father        Social History            Tobacco Use    Smoking status: Current Every Day Smoker       Packs/day: 1.00       Years: 40.00       Pack years: 40.00    Smokeless tobacco: Never Used   Substance Use Topics    Alcohol use: Yes       Comment: occasionally    Drug use: Yes       Frequency: 3.0 times per week       Types: Marijuana           Current Outpatient Medications   Medication Sig    lisinopriL (PRINIVIL, ZESTRIL) 5 mg tablet Take  by mouth daily.  atorvastatin (LIPITOR) 40 mg tablet Take  by mouth daily.     dapagliflozin (Farxiga) 10 mg tab tablet Take  by mouth daily.  metFORMIN (GLUCOPHAGE) 1,000 mg tablet Take 1,000 mg by mouth two (2) times daily (with meals).      No current facility-administered medications for this visit.      No Known Allergies     Review of Systems   Constitutional: Negative for chills, diaphoresis, fever, malaise/fatigue and weight loss. HENT: Negative for congestion, ear pain and sore throat. Eyes: Negative for blurred vision and pain. Respiratory: Negative for cough, hemoptysis, sputum production, shortness of breath, wheezing and stridor. Cardiovascular: Negative for chest pain, palpitations, orthopnea, claudication, leg swelling and PND. Gastrointestinal: Negative for abdominal pain, blood in stool, constipation, diarrhea, heartburn, melena, nausea and vomiting. Genitourinary: Negative for dysuria, flank pain, frequency, hematuria and urgency. Musculoskeletal: Positive for back pain and myalgias. Negative for joint pain and neck pain. Skin: Negative for itching and rash. Neurological: Positive for sensory change (peripheral neuropathy in feet). Negative for dizziness, tremors, focal weakness, seizures, weakness and headaches. Endo/Heme/Allergies: Negative for polydipsia. Psychiatric/Behavioral: Negative for depression and memory loss. The patient is not nervous/anxious.       Visit Vitals  /84 (BP 1 Location: Left arm, BP Patient Position: Sitting, BP Cuff Size: Large adult)   Pulse 87   Temp 97.5 °F (36.4 °C) (Temporal)   Resp 18   Ht 6' (1.829 m)   Wt 106.9 kg (235 lb 9.6 oz)   SpO2 97%   BMI 31.95 kg/m²         Physical Exam  Constitutional:       General: He is not in acute distress. Appearance: Normal appearance. He is well-developed. He is not diaphoretic. HENT:      Head: Normocephalic and atraumatic. Mouth/Throat:      Pharynx: No oropharyngeal exudate. Eyes:      General: No scleral icterus.      Conjunctiva/sclera: Conjunctivae normal.      Pupils: Pupils are equal, round, and reactive to light. Neck:      Thyroid: Thyroid mass present. No thyromegaly. Trachea: Trachea and phonation normal. No tracheal deviation. Cardiovascular:      Rate and Rhythm: Normal rate and regular rhythm. Heart sounds: Normal heart sounds. No murmur heard. No friction rub. No gallop. Pulmonary:      Effort: Pulmonary effort is normal. No respiratory distress. Breath sounds: Normal breath sounds. No stridor. No wheezing or rales. Abdominal:      General: Bowel sounds are normal. There is no distension. Palpations: Abdomen is soft. There is no mass. Tenderness: There is no abdominal tenderness. There is no guarding or rebound. Hernia: No hernia is present. There is no hernia in the left inguinal area. Genitourinary:     Penis: Circumcised. Testes: Normal. Cremasteric reflex is present. Musculoskeletal:         General: No tenderness. Normal range of motion. Cervical back: Full passive range of motion without pain, normal range of motion and neck supple. Lymphadenopathy:      Cervical: No cervical adenopathy. Right cervical: No superficial or deep cervical adenopathy. Left cervical: No superficial or deep cervical adenopathy. Skin:     General: Skin is warm and dry. Findings: No erythema or rash. Neurological:      Mental Status: He is alert and oriented to person, place, and time. Cranial Nerves: No cranial nerve deficit. Coordination: Coordination normal.   Psychiatric:         Behavior: Behavior normal.         Thought Content: Thought content normal.         Judgment: Judgment normal.            ASSESSMENT and PLAN  1. 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 I explained about the anatomy and pathophysiology of thyroid nodules/disease. I explained about possible malignancy.   I discussed FNA, observation, possible thyroid suppression pending FNA, and thyroid lobectomy and total thyroidectomy with limited neck dissection. Risks of surgery include bleeding (potentially requiring emergent exploration at bedside), infection, parathyroid injury/removal requiring supplemental calcium +/- vit d, recurrent laryngeal/superior laryngeal nerve injury resulting in vocal cord paralysis, voice changes and fatigue, aspiration, further surgery, need for lifelong thyroid supplementation.     2. Dysphagia. ?due to #1 or cervical osteophytes. Depending upon the thyroid findings would consider referral to neck/spine specialist  3. New lung nodule on CXR. He has seen Dr Joaquin Davidson   PET done 7/20/2021 was low uptake and likely plan for close follow up but has not seen him in follow up as yet     4. NIDDM type 2 with peripheral neuropathy  On rx  5. Essential hypertension. Stable on rx  6. Hypercholesterolemia. On statin  7. Tobacco use   1ppd x 35 years. Recommended cessation     He desires a total thyroidectomy with limited central neck dissection under general anesthesia with a NIM tube as an outpatient with an overnight stay    The patient was counseled at length about the risks of poly Covid-19 in the lashawn-operative and post-operative states including the recovery window of their procedure. The patient was made aware that poly Covid-19 after a surgical procedure may worsen their prognosis for recovering from the virus and lend to a higher morbidity and or mortality risk. The patient was given the options of postponing their procedure. All of the risks, benefits, and alternatives were discussed.  The patient  wishes to proceed with the procedure.       Gilles Sloan MD FACS

## 2021-09-01 NOTE — ANESTHESIA POSTPROCEDURE EVALUATION
Procedure(s):  TOTAL THYROIDECTOMY WITH LIMITED CENTRAL NECK DISSECTION WITH NIM TUBE. general    Anesthesia Post Evaluation      Multimodal analgesia: multimodal analgesia used between 6 hours prior to anesthesia start to PACU discharge  Patient location during evaluation: bedside  Patient participation: complete - patient participated  Level of consciousness: awake  Pain management: adequate  Airway patency: patent  Anesthetic complications: no  Cardiovascular status: acceptable  Respiratory status: acceptable  Hydration status: acceptable  Post anesthesia nausea and vomiting:  controlled  Final Post Anesthesia Temperature Assessment:  Normothermia (36.0-37.5 degrees C)      INITIAL Post-op Vital signs:   Vitals Value Taken Time   /82 09/01/21 1400   Temp 36.8 °C (98.2 °F) 09/01/21 1007   Pulse 99 09/01/21 1402   Resp 16 09/01/21 1402   SpO2 97 % 09/01/21 1402   Vitals shown include unvalidated device data.

## 2021-09-01 NOTE — OP NOTES
[address]  OPERATIVE REPORT    Name:  Rashad Chaves  MR#:  [id number]  :  1965  ACCOUNT #:  [de-identified]  DATE OF SERVICE:  2021    PREOPERATIVE DIAGNOSIS:  Papillary carcinoma of thyroid. POSTOPERATIVE DIAGNOSIS:  Papillary carcinoma of thyroid. PROCEDURE PERFORMED:  Total thyroidectomy with limited central neck dissection. SURGEON:  Stew Hagan MD    ASSISTANT:  Layne Yip. ANESTHESIA:  General.    COMPLICATIONS:  None. SPECIMENS REMOVED:  1. Thyroid cyst, right upper pole. 2.  Left perithyroidal lymph node. IMPLANTS:  None. ESTIMATED BLOOD LOSS:  Minimal.    FINDINGS:  Large cystic node completely  from the thyroid. No other gross adenopathy in the neck. BRIEF HISTORY:  The patient is a 81-year-old gentleman who was referred over to me for thyroid nodule and as he is having increasing dysphagia. I then did a needle biopsy suggesting a left thyroid nodule in the neck is papillary carcinoma with cystic features. He elected to undergo thyroidectomy. He understands the risks and benefits, and wishes to proceed, these are noted in my office notes. PROCEDURE:  The patient was taken to the operating room, placed on the operating room table in the supine position, underwent general endotracheal anesthesia with a NIM tube to assess nerve monitor, and then, a roll was placed underneath the shoulders. The neck was placed in mild hyperextension. Unfortunately, because of his chronic neck issues, we only had minimal extension of his neck. The neck was then prepped and draped in the usual sterile fashion.   After appropriate time-out and antibiotics were given, 0.5% Marcaine with epinephrine was infiltrated into the skin and subcutaneous tissues in the neck, and a 6 cm incision was made in the subcutaneous tissues gone through with electrocautery, going through the platysma, and subplatysmal flaps were developed superiorly to the thyroid cartilage and inferiorly to the sternal notch. Then, we subsequently dissected the strap muscles along the midline and allowed the strap muscles to fall out laterally. Dissected out those areas and dense adventitia and areolar tissue around it. It made the dissection a little bit more difficult, but we ultimately dissected out the inferior pole and upper pole. Identified the parathyroids and gently teased them off as best we could and then mobilized it from posterolateral to medial approach starting on the left side and taking up off the ligament of Brar. Once that was completed, there was firm mass at the inferior pole of the thyroid, but there was a cystic nodule lateral to it, and we dissected that out as well avoiding both parathyroids and the recurrent laryngeal nerve all along the way. Utilizing a nerve integrity monitor, we tested the nerve and the nerve remained intact throughout the procedure. We had no other gross adenopathy. Once that was completed, we  all that and sent it as separate specimen. Then, we took down the small pyramidal lobe and then dissected out on the right side. The left side was also very difficult to dissect out, but ultimately we were able to partially dissect it free and the parathyroids identified and freed up. We mobilized from posterolateral to medial approach again and identifying the recurrent laryngeal nerve in its path and tested for nerve integrity along the way. Then, pulled it up off the ligament of Berry and pulled it off the trachea. I placed a stitch at the right upper pole in order to orient the specimen. Then, we went back and looked, and another Ligaclip was placed on the small little vessel bleeding on the right side and controlled it. Again, tested the nerves on both sides, no evidence of ongoing bleeding. The parathyroids remained viable, but they would be up a little bit.   Once that was completed, then the Surgicel was placed around the area, and interrupted 2-0 Vicryl was used to approximate the strap muscles and interrupted 3-0 Vicryl was used to approximate the platysma. More local was injected and running 4-0 Vicryl was used to close the skin and a Dermabond dressing was applied. Upon completion of the operation, the needle, sponge, and instrument counts were correct x2. The patient had tolerated the procedure well and was extubated and brought to recovery room.       Prashanth Nieves MD      [id number]  D:  09/01/2021 10:11  T:  09/01/2021 18:10  JOB #:  [id number]  CC:  Evia Frock, MD Cherly Oppenheim, MD

## 2021-09-01 NOTE — BRIEF OP NOTE
Brief Postoperative Note    Patient: Alexa Weeks  YOB: 1965  MRN: [id number]    Date of Procedure: 9/1/2021     Pre-Op Diagnosis: THYROID CANCER    Post-Op Diagnosis: Same as preoperative diagnosis.       Procedure(s):  TOTAL THYROIDECTOMY WITH LIMITED CENTRAL NECK DISSECTION WITH NIM TUBE    Surgeon(s):  Josh Rowe MD    Surgical Assistant: Surg Asst-1: Migue Jeff    Anesthesia: General     Estimated Blood Loss (mL): Minimal    Complications: None    Specimens:   ID Type Source Tests Collected by Time Destination   1 : Left perithyroid lymph node Preservative Thyroid  Josh Rowe MD 9/1/2021 0902 Pathology   2 : Total thyroid-stitch right upper pole Preservative Thyroid  Josh Rowe MD 9/1/2021 5273 Pathology        Implants: * No implants in log *    Drains: * No LDAs found *    Findings: enlarged left cystic lymph node and nodule in thyroid    Electronically Signed by Brittanie Dawson MD on 9/1/2021 at 9:59 AM

## 2021-09-01 NOTE — PERIOP NOTES
[address] from Operating Room to PACU    Report received from [address] [facility],[address] and R Lobb CRNA regarding [employer]. Surgeon(s):  Frida Chavez MD  And Procedure(s) (LRB):  TOTAL THYROIDECTOMY WITH LIMITED CENTRAL NECK DISSECTION WITH NIM TUBE (N/A)  confirmed   with allergies and dressings discussed. Anesthesia type, drugs, patient history, complications, estimated blood loss, vital signs, intake and output, and last pain medication, lines and temperature were reviewed. 1130 Updated pt's friend, Ruchi Landa, regarding pt status & waiting for inpatient transfer. 1530 TRANSFER - OUT REPORT:    Verbal report given to Darshana LANE(name) on [employer]  being transferred to [facility] for routine post - op       Report consisted of patients Situation, Background, Assessment and   Recommendations(SBAR). Information from the following report(s) SBAR, Kardex and OR Summary was reviewed with the receiving nurse. Lines:   Peripheral IV 09/01/21 Posterior;Right Hand (Active)   Site Assessment Clean, dry, & intact 09/01/21 1530   Phlebitis Assessment 0 09/01/21 1530   Infiltration Assessment 0 09/01/21 1530   Dressing Status Clean, dry, & intact 09/01/21 1530   Dressing Type Transparent;Tape 09/01/21 1530   Hub Color/Line Status Pink; Infusing 09/01/21 1530        Opportunity for questions and clarification was provided. Patient transported with:   Jerry Schmitz Updated pt's friend Ruchi Landa on pt's status & transfer to [facility].

## 2021-09-01 NOTE — PROGRESS NOTES
[place]  discontinued during hospital stay per protocol. Patient is on Metformin and Correctional insulin.

## 2021-09-01 NOTE — ANESTHESIA PREPROCEDURE EVALUATION
Relevant Problems   PERSONAL HX & FAMILY HX OF CANCER   (+) Thyroid cancer ([facility] [address]       Anesthetic History   No history of anesthetic complications            Review of Systems / Medical History  Patient summary reviewed and pertinent labs reviewed    Pulmonary        Sleep apnea: No treatment  Smoker  Asthma : well controlled       Neuro/Psych   Within defined limits           Cardiovascular    Hypertension: well controlled          Hyperlipidemia    Exercise tolerance: >4 METS     GI/Hepatic/Renal  Within defined limits              Endo/Other    Diabetes: well controlled  Hypothyroidism       Other Findings   Comments: CT chest  Enlarged left upper paratracheal lymph node measuring 2.5 x 1.9 x 3.3 cm,  suspicious for rosa metastasis. 2. A 12 mm pulmonary nodule in the left upper lobe is indeterminant, but may  represent primary lung malignancy given findings above. Recommend PET/CT for  further evaluation. 3. A 2.3 cm partially calcified left inferior thyroid nodule. Recommend thyroid  ultrasound for further evaluation.     Marijuana use         Physical Exam    Airway  Mallampati: II  TM Distance: > 6 cm  Neck ROM: decreased range of motion   Mouth opening: Normal     Cardiovascular  Regular rate and rhythm,  S1 and S2 normal,  no murmur, click, rub, or gallop  Rhythm: regular  Rate: normal         Dental  No notable dental hx       Pulmonary  Breath sounds clear to auscultation               Abdominal  GI exam deferred       Other Findings            Anesthetic Plan    ASA: 3  Anesthesia type: general    Monitoring Plan: BIS      Induction: Intravenous  Anesthetic plan and risks discussed with: Patient

## 2021-09-01 NOTE — PROGRESS NOTES
09/01/21 5449   Family Communication   Family Update Message Surgeon working   Delivery Origin Nurse  (Silverio Manrique)    Relationship to Patient Friend    Phone Number Ruchi Landa [phone]   Family/Significant Other Update Called

## 2021-09-02 VITALS
TEMPERATURE: 97.3 F | BODY MASS INDEX: 31.84 KG/M2 | DIASTOLIC BLOOD PRESSURE: 83 MMHG | SYSTOLIC BLOOD PRESSURE: 134 MMHG | OXYGEN SATURATION: 98 % | RESPIRATION RATE: 16 BRPM | WEIGHT: 234.79 LBS | HEART RATE: 85 BPM

## 2021-09-02 LAB
ANION GAP SERPL CALC-SCNC: 6 MMOL/L (ref 5–15)
BUN SERPL-MCNC: 14 MG/DL (ref 6–20)
BUN/CREAT SERPL: 23 (ref 12–20)
CALCIUM SERPL-MCNC: 8.5 MG/DL (ref 8.5–10.1)
CHLORIDE SERPL-SCNC: 101 MMOL/L (ref 97–108)
CO2 SERPL-SCNC: 26 MMOL/L (ref 21–32)
CREAT SERPL-MCNC: 0.61 MG/DL (ref 0.7–1.3)
GLUCOSE BLD STRIP.AUTO-MCNC: 176 MG/DL (ref 65–117)
GLUCOSE SERPL-MCNC: 170 MG/DL (ref 65–100)
POTASSIUM SERPL-SCNC: 3.8 MMOL/L (ref 3.5–5.1)
SERVICE CMNT-IMP: ABNORMAL
SODIUM SERPL-SCNC: 133 MMOL/L (ref 136–145)

## 2021-09-02 PROCEDURE — 82962 GLUCOSE BLOOD TEST: CPT

## 2021-09-02 PROCEDURE — 74011250637 HC RX REV CODE- 250/637: Performed by: SURGERY

## 2021-09-02 PROCEDURE — 36415 COLL VENOUS BLD VENIPUNCTURE: CPT

## 2021-09-02 PROCEDURE — 80048 BASIC METABOLIC PNL TOTAL CA: CPT

## 2021-09-02 RX ORDER — CALCIUM CARBONATE 500(1250)
TABLET ORAL
Qty: 120 TABLET | Refills: 1 | Status: SHIPPED
Start: 2021-09-02 | End: 2021-10-22

## 2021-09-02 RX ADMIN — METFORMIN HYDROCHLORIDE 1000 MG: 500 TABLET ORAL at 08:07

## 2021-09-02 RX ADMIN — LISINOPRIL 5 MG: 5 TABLET ORAL at 08:08

## 2021-09-02 RX ADMIN — OXYCODONE 5 MG: 5 TABLET ORAL at 06:09

## 2021-09-02 RX ADMIN — ATORVASTATIN CALCIUM 40 MG: 40 TABLET, FILM COATED ORAL at 08:08

## 2021-09-02 RX ADMIN — Medication 10 ML: at 06:00

## 2021-09-02 RX ADMIN — Medication 1 AMPULE: at 08:08

## 2021-09-02 RX ADMIN — LEVOTHYROXINE SODIUM 125 MCG: 0.12 TABLET ORAL at 06:09

## 2021-09-02 NOTE — PROGRESS NOTES
Primary care for this patient was provided by Michelle Pimentel RN and supervised by hayley Queen RN I agree with all assessment data charted and all nursing interventions conducted. I was present for all interventions and reassessments, appropriate nursing care provided. Pt ambulating and up to chair. Is at bedside. Pain controlled with PRNs.       Avtar Queen RN

## 2021-09-02 NOTE — PROGRESS NOTES
End of Shift Note    Bedside shift change report given to Mikki Paulino RN (oncoming nurse) by Raymundo Cho RN (offgoing nurse). Report included the following information SBAR, Kardex, Intake/Output and MAR    Shift worked:  7am-7pm     Shift summary and any significant changes:     Pt ambulated to the chair once during the shift and tolerated the activity well. Pt complained of pain once during the shift and Morphine was given. This seemed to alleviate the pain. Pt is tolerating the current diet. Calcium labs drawn on pt. Pt was taught how to use incentive spirometer. Concerns for physician to address:  Discharge? Zone phone for oncoming shift:   [phone]       Activity:  Activity Level: Up with Assistance  Number times ambulated in hallways past shift: 0  Number of times OOB to chair past shift: 1    Cardiac:   Cardiac Monitoring: No      Cardiac Rhythm: Sinus Rhythm    Access:   Current line(s): PIV     Genitourinary:   Urinary status: voiding    Respiratory:   O2 Device: None (Room air)  Chronic home O2 use?: NO  Incentive spirometer at bedside: YES  Actual Volume (ml): 750 ml  GI:     Current diet:  ADULT DIET Regular; 3 carb choices (45 gm/meal)  Passing flatus: YES  Tolerating current diet: YES       Pain Management:   Patient states pain is manageable on current regimen: YES    Skin:  Bro Score: 23  Interventions: float heels and increase time out of bed    Patient Safety:  Fall Score:  Total Score: 1  Interventions: gripper socks and pt to call before getting OOB       Length of Stay:  Expected LOS: - - -  Actual LOS: 0      Raymundo Cho RN

## 2021-09-02 NOTE — PROGRESS NOTES
Transition of Care Plan:    RUR: OBS  Disposition: Home w/ f/u appts  Follow up appointments: PCP, Surgeon- details in AVS  DME needed: None  Transportation at Discharge: Friend; ETA 11:30 AM  Keys or means to access home:  Yes  IM Medicare Letter: N/A  Is patient a BCPI-A Bundle:  No         If yes, was Bundle Letter given?: N/A    Caregiver Contact: MotherDedra Gandhi, [phone]  Discharge Caregiver contacted prior to discharge? CM acknowledged d/c.   CM acknowledged observation status. Observation notice provided in writing to patient and/or caregiver as well as verbal explanation of the policy. Patients who are in outpatient status also receive the Observation notice. CM reviewed pt's chart. CM met w/ pt at bedside to introduce role & complete initial assessment. Pt confirmed demographic information is up to date. Pt lives alone in 1 level/ 4 [facility] house. Reports being independent w/ ADLs/ IADLs & drives at baseline. No use of DME or home O2. No hx of rehab or [place]; no current needs identified. Pt reports no barriers w/ transportation or paying for medication. Surgical f/u appt added to AVS. Pt verbalized understanding & is agreeable to d/c. No addtl questions or concerns reported to CM at this time. Pt ready to d/c from CM standpoint. RN notified.     Reason for Admission:  Thyroid cancer                   RUR Score:          OBS           Plan for utilizing home health:      No needs identified    PCP: First and Last name:  Luz Castro MD   Name of Practice: [facility]   Are you a current patient: Yes/No: Yes   Approximate date of last visit:    Can you participate in a virtual visit with your PCP:                     Current Advanced Directive/Advance Care Plan: Full Code  Advance Care Planning     General Advance Care Planning (ACP) Conversation    Date of Conversation: 9/2/2021  Conducted with: Patient with Decision Making Capacity    Healthcare Decision Maker:   No healthcare decision makers have been documented. Click here to complete [address] including selection of the Healthcare Decision Maker Relationship (ie \"Primary\")    Today we discussed [address]. The patient is considering options. Content/Action Overview:   Has NO ACP documents/care preferences - information provided, considering goals and options  Reviewed DNR/DNI and patient elects Full Code (Attempt Resuscitation)    Length of Voluntary ACP Conversation in minutes:  <16 minutes (Non-Billable)    [facility] Management Interventions  PCP Verified by CM: Yes (Arely Pablo MD)  Palliative Care Criteria Met (RRAT>21 & CHF Dx)?: No  Mode of Transport at Discharge:  Other (see comment) (Friend)  Hospital Transport Time of Discharge: [facility] (CM Consult): Discharge Planning  Discharge Durable Medical Equipment: No  Physical Therapy Consult: No  Occupational Therapy Consult: No  Current Support Network: Family Lives Nearby, Lives Alone (1 level/ 4 ROSMERY)  Confirm Follow Up Transport: Self  The Plan for Transition of Care is Related to the Following Treatment Goals : Home w/ f/u appts  The Patient and/or Patient Representative was Provided with a Choice of Provider and Agrees with the Discharge Plan?: Yes  Discharge Location  Discharge Placement: Home (w/ f/u appts)    IRENE Navarrete  Care Management

## 2021-09-02 NOTE — PROGRESS NOTES
Admit Date: 2021    POD 1 Day Post-Op    Procedure:  Procedure(s):  TOTAL THYROIDECTOMY WITH LIMITED CENTRAL NECK DISSECTION WITH NIM TUBE      Assessment:   Active Problems:    Thyroid ca ([address] (2021)      1. Feels well  2. Voice \"froggy\"  3. Calcium ok and no paresthesias      Plan/Recommendations/Medical Decision Makin.  discharge    Subjective:     Patient has complaints of pain. Objective:     Blood pressure 133/82, pulse 91, temperature 98.4 °F (36.9 °C), resp. rate 16, weight 106.5 kg (234 lb 12.6 oz), SpO2 96 %. Temp (24hrs), Av.5 °F (36.9 °C), Min:98.2 °F (36.8 °C), Max:98.6 °F (37 °C)      Physical Exam:  THROAT & NECK: normal, no erythema or exudates noted.   and neck supple and symmetrical.  mild swelling and bruising, mild dysphonia, Cvostek negative, LUNG: clear to auscultation bilaterally, HEART: regular rate and rhythm, S1, S2 normal, no murmur, click, rub or gallop    Labs:   Recent Results (from the past 48 hour(s))   GLUCOSE, POC    Collection Time: 21  6:30 AM   Result Value Ref Range    Glucose (POC) 187 (H) 65 - 117 mg/dL    Performed by Donavan Nieto RN    GLUCOSE, POC    Collection Time: 21 10:12 AM   Result Value Ref Range    Glucose (POC) 244 (H) 65 - 117 mg/dL    Performed by Enrique Adams    GLUCOSE, POC    Collection Time: 21  4:49 PM   Result Value Ref Range    Glucose (POC) 213 (H) 65 - 117 mg/dL    Performed by Raghu Gusman    CALCIUM    Collection Time: 21  6:22 PM   Result Value Ref Range    Calcium 8.7 8.5 - 10.1 MG/DL   GLUCOSE, POC    Collection Time: 21  9:15 PM   Result Value Ref Range    Glucose (POC) 227 (H) 65 - 117 mg/dL    Performed by Ratna Jc (WONG)    METABOLIC PANEL, BASIC    Collection Time: 21  3:49 AM   Result Value Ref Range    Sodium 133 (L) 136 - 145 mmol/L    Potassium 3.8 3.5 - 5.1 mmol/L    Chloride 101 97 - 108 mmol/L    CO2 26 21 - 32 mmol/L    Anion gap 6 5 - 15 mmol/L    Glucose 170 (H) 65 - 100 mg/dL    BUN 14 6 - 20 MG/DL    Creatinine 0.61 (L) 0.70 - 1.30 MG/DL    BUN/Creatinine ratio 23 (H) 12 - 20      GFR est AA >60 >60 ml/min/1.73m2    GFR est non-AA >60 >60 ml/min/1.73m2    Calcium 8.5 8.5 - 10.1 MG/DL   GLUCOSE, POC    Collection Time: 09/02/21  7:06 AM   Result Value Ref Range    Glucose (POC) 176 (H) 65 - 117 mg/dL    Performed by Laly Montilla (PCT)        Data Review images and reports reviewed

## 2021-09-02 NOTE — PROGRESS NOTES
End of Shift Note    Bedside shift change report given to [address] Angel[address] Faust RN (oncoming nurse) by Adriane Worthington RN and Kwadwo Gonzalez RN (offgoing nurse). Report included the following information SBAR, Kardex, Intake/Output, MAR and Recent Results    Shift worked:  7p-7a     Shift summary and any significant changes:     Pt complained of pain twice, Roxicodone given. Pt , refused insulin. Vitals stable. Concerns for physician to address:  none     Zone phone for oncoming shift:   [phone]       Activity:  Activity Level: Up with Assistance  Number times ambulated in hallways past shift: 0  Number of times OOB to chair past shift: 0    Cardiac:   Cardiac Monitoring: No      Cardiac Rhythm: Sinus Rhythm    Access:   Current line(s): PIV     Genitourinary:   Urinary status: voiding    Respiratory:   O2 Device: None (Room air)  Chronic home O2 use?: NO  Incentive spirometer at bedside: YES  Actual Volume (ml): 750 ml  GI:     Current diet:  ADULT DIET Regular; 3 carb choices (45 gm/meal)  Passing flatus: YES  Tolerating current diet: YES       Pain Management:   Patient states pain is manageable on current regimen: YES    Skin:  Bro Score: 22  Interventions: increase time out of bed    Patient Safety:  Fall Score:  Total Score: 1  Interventions: gripper socks, pt to call before getting OOB and stay with me (per policy)       Length of Stay:  Expected LOS: - - -  Actual LOS: 0      Adriane Worthington RN

## 2021-09-02 NOTE — PROGRESS NOTES
DISCHARGE NOTE FROM [facility] NURSE    Patient determined to be stable for discharge by attending provider. I have reviewed the discharge instructions and follow-up appointments with the patient. They verbalized understanding and all questions were answered to their satisfaction. No complaints or further questions were expressed. Medications sent to pharmacy. Appropriate educational materials and medication side effect teaching were provided. PIV were removed prior to discharge. Patient did not discharge with any line, palomino, or drain. All personal items collected during admission were returned to the patient prior to discharge. Post-op patient: Yes- Patient given post-op discharge kit and instructed on use.        Jana Torres RN

## 2021-09-08 ENCOUNTER — OFFICE VISIT (OUTPATIENT)
Dept: SURGERY | Age: 56
End: 2021-09-08
Payer: COMMERCIAL

## 2021-09-08 VITALS
SYSTOLIC BLOOD PRESSURE: 150 MMHG | HEIGHT: 72 IN | DIASTOLIC BLOOD PRESSURE: 81 MMHG | WEIGHT: 235 LBS | HEART RATE: 86 BPM | BODY MASS INDEX: 31.83 KG/M2 | TEMPERATURE: 97.3 F | RESPIRATION RATE: 16 BRPM | OXYGEN SATURATION: 97 %

## 2021-09-08 DIAGNOSIS — Z09 POSTOPERATIVE EXAMINATION: ICD-10-CM

## 2021-09-08 DIAGNOSIS — C73 THYROID CANCER (HCC): Primary | ICD-10-CM

## 2021-09-08 PROCEDURE — 99024 POSTOP FOLLOW-UP VISIT: CPT | Performed by: SURGERY

## 2021-09-08 NOTE — PROGRESS NOTES
Surgery  Follow up  Procedure: total thyroidectomy and limited central neck dissection  OR date:  9/1/2021  Path:    1.  Left perithyroidal lymph node, biopsy:        Papillary thyroid carcinoma involving one lymph node (1/1), see   comment     2.  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     THYROID GLAND    SPECIMEN       Procedure: Total thyroidectomy    TUMOR       Tumor Focality: Multifocal    Tumor Characteristics       Tumor Site: Left lobe       Histologic Type: Papillary carcinoma, classic (usual, conventional)       Tumor Size: 3.0 x 2.5 x 2.3 cm         Tumor Site: Right lobe       Histologic Type: Papillary carcinoma, classic (usual, conventional)       Tumor Size: 0.2 cm         Extrathyroidal Extension: Not identified       Angioinvasion (vascular invasion): Not identified       Lymphatic Invasion: Not identified       Margins: Uninvolved by carcinoma   LYMPH NODES       Number of Lymph Nodes Involved: 1       Jonathan Levels Involved: Level VI       Size of Largest Metastatic Deposit (Centimeters): At least: 1.6 cm       Extranodal Extension (RANDY): Not identified       Number of Lymph Nodes Examined: 1           Jonathan Levels Examined: Level VI    PATHOLOGIC STAGE CLASSIFICATION (pTNM, AJCC 8th Edition)       TNM Descriptors: m (multiple primary tumors)       Primary Tumor (pT): pT2       Regional Lymph Nodes (pN): pN1a   Comment  Specimen #1, the left perithyroidal lymph node, is largely cystic with   relatively limited lymphoid tissue.     S I feel fine, no paresthesias    Visit Vitals  BP (!) 150/81 (BP 1 Location: Left upper arm, BP Patient Position: Sitting, BP Cuff Size: Adult)   Pulse 86   Temp 97.3 °F (36.3 °C)   Resp 16   Ht 6' (1.829 m)   Wt 106.6 kg (235 lb)   SpO2 97%   BMI 31.87 kg/m²       O Incisions healing well without infection   Mild dysphonia   Cvostek negative       A/P Doing well overall   Refer to Dr Aiden Gill for possible GUTIERRES and management of his cancer   Check TSH, Free T4, thyroglobulin levels in 6 weeks   RTC 6 weeks    Anila Lentz MD FACS

## 2021-09-08 NOTE — PROGRESS NOTES
Identified pt with two pt identifiers(name and ). Reviewed record in preparation for visit and have obtained necessary documentation. All patient medications has been reviewed. Chief Complaint   Patient presents with    Surgical Follow-up     Total thyroidectomy with limited central neck dissection. 21       Health Maintenance Due   Topic    Hepatitis C Screening     Pneumococcal 0-64 years (1 of 2 - PPSV23)    Lipid Screen     COVID-19 Vaccine (1)    DTaP/Tdap/Td series (1 - Tdap)    Colorectal Cancer Screening Combo     Shingrix Vaccine Age 50> (1 of 2)    Low dose CT lung screening     Flu Vaccine (1)       Vitals:    21 1401   BP: (!) 150/81   Pulse: 86   Resp: 16   Temp: 97.3 °F (36.3 °C)   SpO2: 97%   Weight: 106.6 kg (235 lb)   Height: 6' (1.829 m)   PainSc:   4   PainLoc: Neck       4. Have you been to the ER, urgent care clinic since your last visit? Hospitalized since your last visit? No    5. Have you seen or consulted any other health care providers outside of the [address] [place] [address] since your last visit? Include any pap smears or colon screening. No      Patient is accompanied by self I have received verbal consent from Usha Hernandez to discuss any/all medical information while they are present in the room.

## 2021-09-17 ENCOUNTER — TELEPHONE (OUTPATIENT)
Dept: SURGERY | Age: 56
End: 2021-09-17

## 2021-09-17 NOTE — TELEPHONE ENCOUNTER
Called patient to let him know that I need doctor to sign forms and I will fax them with RTW letter to [phone].

## 2021-09-17 NOTE — LETTER
NOTIFICATION OF RETURN TO WORK / SCHOOL    9/17/2021 10:47 AM    Mr. Litzy Kelly  [id number] [facility] [address]        To Whom It May Concern:    Litzy Kelly was under the care of [address]     He will be able to return to work on Sept 20, 2021 with no restrictions. If there are questions or concerns please have the patient contact our office.     Sincerely,      Stew Hagan MD

## 2021-09-17 NOTE — TELEPHONE ENCOUNTER
Patient called asking when forms would be ready to be picked up, pt also is needing release to go back to work from the Dr    Please give patient a call back  BCB# [phone]

## 2021-09-17 NOTE — TELEPHONE ENCOUNTER
[facility] paperwork and RTW letter faxed to [phone], confirmation received.

## 2021-09-28 ENCOUNTER — OFFICE VISIT (OUTPATIENT)
Dept: ENDOCRINOLOGY | Age: 56
End: 2021-09-28
Payer: COMMERCIAL

## 2021-09-28 VITALS
BODY MASS INDEX: 32.4 KG/M2 | HEART RATE: 85 BPM | HEIGHT: 72 IN | WEIGHT: 239.2 LBS | SYSTOLIC BLOOD PRESSURE: 134 MMHG | DIASTOLIC BLOOD PRESSURE: 77 MMHG

## 2021-09-28 DIAGNOSIS — C73 THYROID CANCER (HCC): Primary | ICD-10-CM

## 2021-09-28 PROCEDURE — 99204 OFFICE O/P NEW MOD 45 MIN: CPT | Performed by: INTERNAL MEDICINE

## 2021-09-28 NOTE — PROGRESS NOTES
Chief Complaint   Patient presents with    Thyroid Problem     pcp and pharmacy confirmed     History of Present Illness: Katya Zaapta is a 55 y.o. male who is a new patient for thyroid cancer. In 2018 was having some neck pain and had some imaging of his neck that incidentally saw a 5 cm nodule on his left thyroid and was told to further evaluate this but given he was having more trouble with his hip focused on this first and had hip replacement surgery in Jan 2021. Then in June 2021 saw Dr. Saleem George and he ordered a thyroid ultrasound that showed his nodule to be 6 cm in size and recommended a biopsy and this was done on 7/9/21 and came back positive for papillary thyroid cancer and decision was made for total thyroidectomy and had this on 9/1/21. No exposure to neck radiation. Had noticed over the past few years more trouble swallowing some meats and some pills. Final pathology showed a 3 cm left lobe classic papillary cancer and 0.2 cm right lobe cancer and one level 6 lymph node was removed that was positive for cancer with no extrathyroidal extension nor lymphovascular invasion. Was started on levothyroxine 150 mcg daily and tries to take it around 3-4 am and did miss a few doses initially but the past few weeks has gotten consistently. Was put on calcium 2 tabs bid after surgery and currently is down to 2 tabs with dinner. No perioral tingling or hand tingling but does have neuropathy in his feet from diabetes. Does have some new cramping in the legs at night since surgery. Overall energy is not great but no worse since surgery. No chest pain or palpitations. Does have some tremors that predated the surgery. Bowels are sometimes constipated. Weight up about 7 lbs since surgery.         Past Medical History:   Diagnosis Date    Asthma     childhood    Colon polyp     Diabetes ([facility] [address]     High cholesterol     Hypertension     Sleep apnea     no cpap    Thyroid cancer (HCC)      Past Surgical History:   Procedure Laterality Date    HX COLONOSCOPY      HX ORTHOPAEDIC Right 01/2021    Rt [facility] replacement [facility]    HX OTHER SURGICAL  09/01/2021    Total thyroidectomy with limited central neck dissection. Current Outpatient Medications   Medication Sig    calcium carbonate (OS-DAMARI) 500 mg calcium (1,250 mg) tablet Take 2 tabs with lunch and dinner (Patient taking differently: Take 2 tabs with dinner)    levothyroxine (SYNTHROID) 150 mcg tablet Take 1 Tablet by mouth Daily (before breakfast) for 30 days. Indications: additional treatment for thyroid cancer    lisinopriL (PRINIVIL, ZESTRIL) 5 mg tablet Take  by mouth daily.  atorvastatin (LIPITOR) 40 mg tablet Take  by mouth daily.  dapagliflozin (Farxiga) 10 mg tab tablet Take  by mouth daily.  metFORMIN (GLUCOPHAGE) 1,000 mg tablet Take 1,000 mg by mouth two (2) times daily (with meals). No current facility-administered medications for this visit. No Known Allergies     Family History   Problem Relation Age of Onset    Diabetes Mother     Hypertension Mother     Diabetes Father     Heart Disease Father     Thyroid Cancer Sister      Social History     Socioeconomic History    Marital status: SINGLE     Spouse name: Not on file    Number of children: Not on file    Years of education: Not on file    Highest education level: Not on file   Occupational History    Not on file   Tobacco Use    Smoking status: Current Every Day Smoker     Packs/day: 0.25     Years: 40.00     Pack years: 10.00    Smokeless tobacco: Never Used    Tobacco comment: down from 1ppd as of 9/21   Vaping Use    Vaping Use: Never used   Substance and Sexual Activity    Alcohol use:  Yes     Alcohol/week: 24.0 standard drinks     Types: 24 Cans of beer per week     Comment: 8-10 drinks, 2 times per week    Drug use: Yes     Frequency: 3.0 times per week     Types: Marijuana     Comment: uses 3 times per week, 2 days ago last uses  Sexual activity: Not on file   Other Topics Concern    Not on file   Social History Narrative    Lives in [facility] alone. No kids. 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 Likes to golf and fish. Social Determinants of Health     Financial Resource Strain:     Difficulty of Paying Living Expenses:    Food Insecurity:     Worried About Running Out of Food in the Last Year:     [address] in the Last Year:    Transportation Needs:     Lack of Transportation (Medical):  Lack of Transportation (Non-Medical):    Physical Activity:     Days of Exercise per Week:     Minutes of Exercise per Session:    Stress:     Feeling of Stress :    Social Connections:     Frequency of Communication with Friends and Family:     Frequency of Social Gatherings with Friends and Family:     Attends [employer] Services:     Active Member of Clubs or Organizations:     Attends Club or Organization Meetings:     Marital Status:    Intimate Partner Violence:     Fear of Current or Ex-Partner:     Emotionally Abused:     Physically Abused:     Sexually Abused:      Review of Systems: per HPI    Physical Examination:  Blood pressure 134/77, pulse 85, height 6' (1.829 m), weight 239 lb 3.2 oz (108.5 kg).   - General: pleasant, no distress, good eye contact  - HEENT: no exopthalmos, no periorbital edema, no scleral/conjunctival injection, EOMI, no lid lag or stare  - Neck: supple, healed inferior neck incision with slight soft tissue swelling superior to the incision, no masses, lymph nodes, or carotid bruits, no supraclavicular or dorsocervical fat pads  - Cardiovascular: regular, normal rate, normal S1 and S2, no murmurs/rubs/gallops   - Respiratory: clear to auscultation bilaterally  - Gastrointestinal: soft, nontender, nondistended, no masses, no hepatosplenomegaly  - Musculoskeletal: no proximal muscle weakness in upper or lower extremities  - Integumentary: no acanthosis nigricans, no abdominal striae, no rashes, no edema  - Neurological: reflexes 2+ at biceps, no tremor  - Psychiatric: normal mood and affect    Data Reviewed:   - pathology report:    * * *FINAL PATHOLOGIC DIAGNOSIS* * *     1.  Left perithyroidal lymph node, biopsy:        Papillary thyroid carcinoma involving one lymph node (1/1), see   comment     2.  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     THYROID GLAND    SPECIMEN       Procedure: Total thyroidectomy    TUMOR       Tumor Focality: Multifocal    Tumor Characteristics       Tumor Site: Left lobe       Histologic Type: Papillary carcinoma, classic (usual, conventional)       Tumor Size: 3.0 x 2.5 x 2.3 cm         Tumor Site: Right lobe       Histologic Type: Papillary carcinoma, classic (usual, conventional)       Tumor Size: 0.2 cm         Extrathyroidal Extension: Not identified       Angioinvasion (vascular invasion): Not identified       Lymphatic Invasion: Not identified       Margins: Uninvolved by carcinoma   LYMPH NODES       Number of Lymph Nodes Involved: 1       Jonathan Levels Involved: Level VI       Size of Largest Metastatic Deposit (Centimeters): At least: 1.6 cm       Extranodal Extension (RANDY): Not identified       Number of Lymph Nodes Examined: 1           Jonathan Levels Examined: Level VI    PATHOLOGIC STAGE CLASSIFICATION (pTNM, AJCC 8th Edition)       TNM Descriptors: m (multiple primary tumors)       Primary Tumor (pT): pT2       Regional Lymph Nodes (pN): pN1a  -------------------------------------------------------------------------------------------------------------------    - thyroid ultrasound report 6/17/21:  INDICATION:   left thyroid nodule      EXAM: Thyroid Sonogram.     COMPARISON: Chest CT 6/17/2021.  No other BSR imaging.     FINDINGS:   Left lobe is enlarged by a predominantly cystic, partly calcified nodule  measuring approximately 6.1 x 2.2 x 2.2 cm, apparent on same day Chest CT. Right lobe is uniform other than 2 punctate cysts.     Right lobe measures approximately 5.6 x 1.8 x 1.6 cm. Left lobe measures approximately 7.1 x 2.3 x 2.2 cm.     There is no significant anterior cervical adenopathy.     IMPRESSION  Left thyroid cystic/solid nodule. Assessment/Plan:   1. Thyroid cancer [facility] In 2018 was having some neck pain and had some imaging of his neck that incidentally saw a 5 cm nodule on his left thyroid and was told to further evaluate this but given he was having more trouble with his hip focused on this first and had hip replacement surgery in Jan 2021. Then in June 2021 saw Dr. Shakila Garcia and he ordered a thyroid ultrasound that showed his nodule to be 6 cm in size and recommended a biopsy and this was done on 7/9/21 and came back positive for papillary thyroid cancer and decision was made for total thyroidectomy and had this on 9/1/21. No exposure to neck radiation. Had noticed over the past few years more trouble swallowing some meats and some pills. Final pathology showed a 3 cm left lobe classic papillary cancer and 0.2 cm right lobe cancer and one level 6 lymph node was removed that was positive for cancer with no extrathyroidal extension nor lymphovascular invasion. Was started on levothyroxine 150 mcg daily and tries to take it around 3-4 am and did miss a few doses initially but the past few weeks has gotten consistently. I explained that he will likely benefit from GUTIERRES but I would like to see his initial thyroglobulin level to make this determination and will also await his TFTs to see if he needs a change in his dose of levothyroxine. - check TSH, free T4 and thyroglobulin reflex panel in 2 weeks.   - cont levothyroxine 150 mcg daily until labs are back         Patient Instructions   1) I will await your thyroglobulin (thyroid cancer blood test) and speak with Dr. Shakila Garcia about your case to determine a final plan of whether to pursue radioactive iodine or not and will call you with the plan. Follow-up and Dispositions    · Return to be determined based on lab results. Copy sent to:  Surinder Sosa MD as PCP - General (Family Medicine)  Martha Grey MD as Surgeon (General Surgery)    Lab follow up: 10/22/21  Component      Latest Ref Rng & Units 10/13/2021 10/13/2021 10/13/2021 10/13/2021           3:17 PM  3:17 PM  3:17 PM  3:17 PM   TSH      0.450 - 4.500 uIU/mL    18.100 (H)   T4, Free      0.82 - 1.77 ng/dL   1.29    Thyroglobulin Ab      0.0 - 0.9 IU/mL  <1.0     Thyroglobulin by AUGUSTO      1.4 - 29.2 ng/mL 0.1 (L)        Called and spoke with pt about lab results. I told him that his TSH is high at 18.1 which means his dose of levothyroxine is not enough. He states he has missed about 6 doses over the past month but has doubled up when he missed. He has been off the calcium tabs and no perioral or hand numbness/tingling so I told him it's fine to stay off the calcium. He does feel sluggish and I told him his dose of levothyroxine is not enough and I will increase his dose to 200 mcg daily and have sent a new prescription to [facility]. His thyroglobulin (thyroid cancer blood test) is low at 0.1 which is a very good sign that he does not appear to have spread of his thyroid cancer outside of his thyroid bed especially given his TSH was 18 when this was drawn. I discussed his case with some colleagues and we agreed it would be reasonable to hold on GUTIERRES at this time and see what happens with his TG over time and can always give this in the future if his TG starts rising and he is agreeable to this plan. He is agreeable to coming for a f/u on 12/16/21 at 10:30am and I put in a lab order at [facility] to repeat his labs a week before this visit. he voiced understanding of this plan.

## 2021-09-28 NOTE — PATIENT INSTRUCTIONS
1) I will await your thyroglobulin (thyroid cancer blood test) and speak with Dr. Mckenzie Bruno about your case to determine a final plan of whether to pursue radioactive iodine or not and will call you with the plan.

## 2021-10-15 LAB
T4 FREE SERPL-MCNC: 1.29 NG/DL (ref 0.82–1.77)
THYROGLOB AB SERPL-ACNC: <1 IU/ML (ref 0–0.9)
THYROGLOB SERPL-MCNC: 0.1 NG/ML (ref 1.4–29.2)
TSH SERPL DL<=0.005 MIU/L-ACNC: 18.1 UIU/ML (ref 0.45–4.5)

## 2021-10-20 ENCOUNTER — OFFICE VISIT (OUTPATIENT)
Dept: SURGERY | Age: 56
End: 2021-10-20
Payer: COMMERCIAL

## 2021-10-20 VITALS
WEIGHT: 242.6 LBS | TEMPERATURE: 97.5 F | RESPIRATION RATE: 20 BRPM | HEIGHT: 72 IN | HEART RATE: 95 BPM | DIASTOLIC BLOOD PRESSURE: 76 MMHG | SYSTOLIC BLOOD PRESSURE: 138 MMHG | OXYGEN SATURATION: 97 % | BODY MASS INDEX: 32.86 KG/M2

## 2021-10-20 DIAGNOSIS — R13.12 OROPHARYNGEAL DYSPHAGIA: ICD-10-CM

## 2021-10-20 DIAGNOSIS — R49.0 DYSPHONIA: ICD-10-CM

## 2021-10-20 DIAGNOSIS — C73 THYROID CANCER (HCC): ICD-10-CM

## 2021-10-20 DIAGNOSIS — Z09 POSTOPERATIVE EXAMINATION: Primary | ICD-10-CM

## 2021-10-20 PROCEDURE — 99024 POSTOP FOLLOW-UP VISIT: CPT | Performed by: SURGERY

## 2021-10-20 RX ORDER — IBUPROFEN 200 MG
TABLET ORAL
COMMUNITY
Start: 2021-08-31 | End: 2021-12-16

## 2021-10-20 RX ORDER — LEVOTHYROXINE SODIUM 150 UG/1
TABLET ORAL
COMMUNITY
Start: 2021-10-08 | End: 2021-10-22

## 2021-10-20 NOTE — PROGRESS NOTES
Surgery  Follow up  Procedure: total thyroidectomy and limited central neck dissection  OR date:  9/1/2021  Path:    1.  Left perithyroidal lymph node, biopsy:        Papillary thyroid carcinoma involving one lymph node (1/1), see   comment     2.  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     THYROID GLAND    SPECIMEN       Procedure: Total thyroidectomy    TUMOR       Tumor Focality: Multifocal    Tumor Characteristics       Tumor Site: Left lobe       Histologic Type: Papillary carcinoma, classic (usual, conventional)       Tumor Size: 3.0 x 2.5 x 2.3 cm         Tumor Site: Right lobe       Histologic Type: Papillary carcinoma, classic (usual, conventional)       Tumor Size: 0.2 cm         Extrathyroidal Extension: Not identified       Angioinvasion (vascular invasion): Not identified       Lymphatic Invasion: Not identified       Margins: Uninvolved by carcinoma   LYMPH NODES       Number of Lymph Nodes Involved: 1       Jonathan Levels Involved: Level VI       Size of Largest Metastatic Deposit (Centimeters): At least: 1.6 cm       Extranodal Extension (RANDY): Not identified       Number of Lymph Nodes Examined: 1           Jontahan Levels Examined: Level VI    PATHOLOGIC STAGE CLASSIFICATION (pTNM, AJCC 8th Edition)       TNM Descriptors: m (multiple primary tumors)       Primary Tumor (pT): pT2       Regional Lymph Nodes (pN): pN1a   Comment  Specimen #1, the left perithyroidal lymph node, is largely cystic with   relatively limited lymphoid tissue. S I feel tired and gaining weight, no paresthesias.   He still notes some dysphagia that is similar to prior to his thyroidectomy and reports voice fatigue and mild hoarseness    Visit Vitals  /76 (BP 1 Location: Left upper arm, BP Patient Position: Sitting)   Pulse 95   Temp 97.5 °F (36.4 °C) (Temporal)   Resp 20   Ht 6' (1.829 m)   Wt 110 kg (242 lb 9.6 oz)   SpO2 97%   BMI 32.90 kg/m²       O Incisions healing well without infection   Mild dysphonia   Cvostek negative     10/13/2021  TSH 18.1  Free T4 1.29  Thyroglobulin 0.1   Thyroglobulin Ab  <1.0    A/P Doing ok overall   Thyroglobulin non detectable! TSH high. Taking rx appropriately   Will defer to Dr Pierre Delgado to adjust Synthroid dosing and hopefully he will not require GUTIERRES   Dysphagia that was present preop. Will refer to speech therapy for assessment and video swallow   Dysphonia and voice fatigue.   Will refer to ENT for vocal cord/nerve assessment but suspect it will improve      RTC 2 months    Nuno Diaz MD FACS

## 2021-10-20 NOTE — PROGRESS NOTES
Identified pt with two pt identifiers(name and ). Reviewed record in preparation for visit and have obtained necessary documentation. All patient medications has been reviewed. Chief Complaint   Patient presents with    Surgical Follow-up     s/p Total thyroidectomy with limited central neck dissection 2021       Health Maintenance Due   Topic    Hepatitis C Screening     Pneumococcal 0-64 years (1 of 2 - PPSV23)    Lipid Screen     COVID-19 Vaccine (1)    DTaP/Tdap/Td series (1 - Tdap)    Colorectal Cancer Screening Combo     Shingrix Vaccine Age 50> (1 of 2)    Flu Vaccine (1)       Vitals:    10/20/21 1330   BP: 138/76   Pulse: 95   Resp: 20   Temp: 97.5 °F (36.4 °C)   TempSrc: Temporal   SpO2: 97%   Weight: 110 kg (242 lb 9.6 oz)   Height: 6' (1.829 m)   PainSc:   0 - No pain       4. Have you been to the ER, urgent care clinic since your last visit? Hospitalized since your last visit? No    5. Have you seen or consulted any other health care providers outside of the [address] [place] [address] since your last visit? Include any pap smears or colon screening. No      Patient is accompanied by self I have received verbal consent from Queen Lui to discuss any/all medical information while they are present in the room.

## 2021-10-22 RX ORDER — LEVOTHYROXINE SODIUM 200 UG/1
200 TABLET ORAL
Qty: 90 TABLET | Refills: 3 | Status: SHIPPED | OUTPATIENT
Start: 2021-10-22 | End: 2022-04-14

## 2021-10-26 ENCOUNTER — TELEPHONE (OUTPATIENT)
Dept: ENDOCRINOLOGY | Age: 56
End: 2021-10-26

## 2021-10-26 NOTE — TELEPHONE ENCOUNTER
----- Message from Dalton Moura sent at 10/26/2021 12:08 PM EDT -----  Regarding: /telephone  General Message/Vendor Calls    Caller's first and last name:self      Reason for call:increase dosage       Callback required yes/no and why: yes to speak with Dr. Benigno Guzman contact number(s):[phone]      Details to clarify the request:Pt called to speak with Mere Gautam, regarding on the pt's medication. Pt is asking if  can increase the dosage.       Dalton Moura

## 2021-11-01 NOTE — TELEPHONE ENCOUNTER
This message is in a duplicate chart and I have sent a message to nicole this chart to be merged with his original chart that has the rest of his information. I left him a voicemail letting him know that I received his message from last week and Nayana Mccoy unsuccessfully tried to reach him twice last week. I understand he was calling about a possible dose increase in his thyroid medication but I let him know I had just increased his dose on 10/22/21 and it takes 4-6 weeks to reach a steady state after a dose increase and I will not be increasing his dose further and he needs to be patient for the higher dose to kick in. If he has any further questions, he should call me back.

## 2021-11-19 ENCOUNTER — TRANSCRIBE ORDER (OUTPATIENT)
Dept: SCHEDULING | Age: 56
End: 2021-11-19

## 2021-11-19 DIAGNOSIS — R91.1 NODULE OF UPPER LOBE OF LUNG: Primary | ICD-10-CM

## 2021-12-02 DIAGNOSIS — C73 THYROID CANCER (HCC): ICD-10-CM

## 2021-12-16 ENCOUNTER — VIRTUAL VISIT (OUTPATIENT)
Dept: ENDOCRINOLOGY | Age: 56
End: 2021-12-16
Payer: COMMERCIAL

## 2021-12-16 DIAGNOSIS — C73 THYROID CANCER (HCC): Primary | ICD-10-CM

## 2021-12-16 PROCEDURE — 99213 OFFICE O/P EST LOW 20 MIN: CPT | Performed by: INTERNAL MEDICINE

## 2021-12-16 NOTE — PROGRESS NOTES
Chief Complaint   Patient presents with    Thyroid Problem     [phone] phone call    Other     pcp and pharmacy confirmed       **THIS IS A VIRTUAL VISIT VIA A TELEPHONE ENCOUNTER. PATIENT AGREED TO HAVE THEIR CARE DELIVERED OVER THE PHONE IN PLACE OF THEIR REGULARLY SCHEDULED OFFICE VISIT**    History of Present Illness: Merline Eldridge is a 55 y.o. male here for follow up of thyroid. Just tested positive for COVID the week after Thanksgiving and did not have any severe symptoms. Has been taking the levothyroxine 200 mcg daily since 10/22/21 in the middle of the night. Has noticed his bowel movements are less frequent and more like rabbit pellets ever since his thyroid surgery. Energy was starting to do better on the higher dose of levothyroxine but then got COVID and has had more fatigue. No heat or cold intolerance. No new neck lumps. Does feel a little pressure on the left side of his throat when he is sleeping. No chest pain or palpitations or tremors. Having some trouble with his voice that may be from acid reflux and was prescribed a medication but hasn't picked it up yet. Thinks this started after his thyroid surgery and unclear if the levothyroxine is the cause or if possibly his levels are off as he hasn't had his labs drawn yet but will try to get them done in the next week. Current Outpatient Medications   Medication Sig    levothyroxine (SYNTHROID) 200 mcg tablet Take 1 Tablet by mouth Daily (before breakfast). Delete 150 mcg dose from profile    lisinopriL (PRINIVIL, ZESTRIL) 5 mg tablet Take  by mouth daily.  atorvastatin (LIPITOR) 40 mg tablet Take  by mouth daily.  dapagliflozin (Farxiga) 10 mg tab tablet Take  by mouth daily.  metFORMIN (GLUCOPHAGE) 1,000 mg tablet Take 1,000 mg by mouth two (2) times daily (with meals). No current facility-administered medications for this visit.      No Known Allergies     Review of Systems: PER HPI    Physical Examination: NOT PERFORMED DUE TO THIS BEING A TELEPHONE CALL      Data Reviewed:   - none new for review    Assessment/Plan:     1. Thyroid cancer [facility]): In 2018 was having some neck pain and had some imaging of his neck that incidentally saw a 5 cm nodule on his left thyroid and was told to further evaluate this but given he was having more trouble with his hip focused on this first and had hip replacement surgery in Jan 2021. Then in June 2021 saw Dr. Speedy Lafleur and he ordered a thyroid ultrasound that showed his nodule to be 6 cm in size and recommended a biopsy and this was done on 7/9/21 and came back positive for papillary thyroid cancer and decision was made for total thyroidectomy and had this on 9/1/21. No exposure to neck radiation. Had noticed over the past few years more trouble swallowing some meats and some pills. Final pathology showed a 3 cm left lobe classic papillary cancer and 0.2 cm right lobe cancer and one level 6 lymph node was removed that was positive for cancer with no extrathyroidal extension nor lymphovascular invasion. Was started on levothyroxine 150 mcg daily and tries to take it around 3-4 am and TSH 18.1 and TG 0.1 in 10/21 so increased dose to 200 mcg daily. Decided to hold on GUTIERRES given his TG was so low with a TSH of 18 and will consider this in the future if his TG rises. - check TSH, free T4 and thyroglobulin reflex panel now and prior to next visit  - cont levothyroxine 200 mcg daily until labs are back         Follow-up and Dispositions    · Return 6/14/22 at 3:50pm.         I spent a total of 18 minutes on the day of this virtual visit with a telephone encounter. All questions were answered.       Copy sent to:  Sawyer Michelle MD as PCP - General (Family Medicine)  Jose Stuart MD as Surgeon (General Surgery)    Lab follow up: 04/10/22  Component      Latest Ref Rng & Units 4/7/2022 4/7/2022 4/7/2022 4/7/2022           2:26 PM  2:26 PM  2:26 PM  2:26 PM   T4, Free 0.82 - 1.77 ng/dL    1.82 (H)   Thyroglobulin Ab      0.0 - 0.9 IU/mL   <1.0    TSH      0.450 - 4.500 uIU/mL  5.380 (H)     Thyroglobulin by AUGUSTO      1.4 - 29.2 ng/mL <0.1 (L)        Sent him the following message through [employer]:  TSH is a thyroid test.  Your level is 5.38 which is high and above goal of 0.1-0.5 during the 1st 2 years for someone who has had thyroid cancer surgery. This test goes opposite of your thyroid dose and suggests your dose of levothyroxine is possibly not enough or you have missed doses in the past 6 weeks or are not taking it properly. Please confirm if you have missed any doses or not in the past 6 weeks. Are you taking on an empty stomach with just water at least 30 min before food and coffee? Are you taking any vitamins within 4 hours of your pill? Are you on any new acid reflux medications or any other new medications? Let me know the answers to these questions and I'll determine if we need to make a dose change or not.    -------------------------------------------------------------------------------------------------------------------  Your free T4 was slightly high at 1.82 but I'm not concerned about this as often this will go up if you have taken your thyroid pill within 12 hours of having your labs drawn.  -------------------------------------------------------------------------------------------------------------------  Your thyroglobulin (thyroid cancer blood test) is undetectable which is a very good sign that you don't have any evidence of thyroid cancer 7 months after your surgery. Addendum: 04/14/22    We had the following mychart exchange:    Ok. Thanks for letting me know. I will add this to your list.  I recommend we increase your dose to 1 tab on Mon-Sat and 2 tabs on Sunday and I updated your prescription at [facility] for this dose so you will receive 105 tabs for a 90 day supply. -------------------------------------------------------------------------------------------------------------------  I put an order directly into the [employer] system to repeat your labs in the 1-2 weeks prior to your next visit so just ask for the order under my name and you will receive a courtesy reminder through [employer] to have these drawn.    ===View-only below this line===      ----- Message -----       From:Mark Amaro       Sent:4/14/2022  3:55 PM EDT         To:Sim Tucker MD    Subject:Meds    Starting 2night      ----- Message -----       From:Mark Amaro       Sent:4/14/2022  3:28 PM EDT         To:Sim Tucker MD    Subject:Meds    Omeprazole dr 40 mg once a day for reflux      ----- Message -----       From:Sim Tucker MD       Sent:4/14/2022 12:26 PM EDT         To:Mark Amaro    Subject:Meds    That would be great and then I can determine a plan for your dose. Thanks!      ----- Message -----       From:Mark Amaro       Sent:4/14/2022 12:01 PM EDT         To:Sim Tucker MD    Subject:Meds    I've been taking my thyroid medication regularly In the middle of the night with no food. I'm going to the pharmacy today To get the medication for the reflux. Not sure what the medication is called I can let you know after I pick it up. Lab follow up: 06/17/22  Component      Latest Ref Rng & Units 6/15/2022 6/15/2022           3:38 PM  3:38 PM   T4, Free      0.82 - 1.77 ng/dL  2.14 (H)   TSH      0.450 - 4.500 uIU/mL 0.835      Sent him the following message through [employer]:  I saw you cancelled your visit for 6/14/22 and didn't reschedule so I wanted to go over your labs:    TSH is a thyroid test.  Your level is 0.83 which is back to normal and at goal of 0.45-2.0. This test goes opposite of your thyroid dose and suggests your dose of levothyroxine 8 tabs/week is perfect so I will keep your dose the same.     I will have the office call you to reschedule a visit for 6 months. I will mail you a lab slip. Please use this to have your labs drawn in the 1-2 weeks prior to your next visit. I placed the order for a repeat thyroid ultrasound to ensure that you have no evidence of thyroid cancer. Please call the Patient Care team at [phone] to schedule this appointment at your earliest convenience.

## 2021-12-20 ENCOUNTER — TELEPHONE (OUTPATIENT)
Dept: SURGERY | Age: 56
End: 2021-12-20

## 2021-12-20 NOTE — TELEPHONE ENCOUNTER
Pt diagnosed with COVID, 12/22 appt cancelled, pt wanted a phone call only. Pt questioning about (Voice Therapist Appt - was that scheduled? Please call Pt.

## 2021-12-20 NOTE — TELEPHONE ENCOUNTER
Patient scheduled for 12/22 at 4pm with Dr Lanre Cha to follow up. Patient is a week out from having covid, and is requesting a phone visit at his appointment.  Routed to Dr Lanre Cha

## 2021-12-21 NOTE — TELEPHONE ENCOUNTER
Spoke with patient to let him know that Dr Nesha Holder is fine to speak with him over the phone for his appointment 12/22. Referral for ENT and speech therapy placed at last visit 10/20. Patient said that he never heard from speech therapy. I called them today to follow up on referral. They called patient to schedule an appointment several times with no answer. I gave patient their contact number [phone], and he said he will call them to make an appointment.

## 2021-12-22 ENCOUNTER — TELEPHONE (OUTPATIENT)
Dept: SURGERY | Age: 56
End: 2021-12-22

## 2021-12-23 NOTE — TELEPHONE ENCOUNTER
spoke w patient    Still needs swallowing study and speech pah evaluation    Yen please assist    Shanika Garcia MD FACS

## 2022-03-14 ENCOUNTER — TELEPHONE (OUTPATIENT)
Dept: SURGERY | Age: 57
End: 2022-03-14

## 2022-03-14 DIAGNOSIS — R13.12 OROPHARYNGEAL DYSPHAGIA: Primary | ICD-10-CM

## 2022-03-15 NOTE — TELEPHONE ENCOUNTER
Spoke with patient and Dr Erica Ye has ordered a Chest CT, and he needs a CT ordered of his throat he says. I advised patient that he would need an office follow up with Dr Benton Perry since we have not seen him 10/20/21. Patient said that he will come in to see Dr Benton Perry after he has a CT done.

## 2022-03-19 PROBLEM — C73 THYROID CANCER (HCC): Status: ACTIVE | Noted: 2021-07-21

## 2022-03-19 PROBLEM — C73 THYROID CA (HCC): Status: ACTIVE | Noted: 2021-09-01

## 2022-03-22 ENCOUNTER — TRANSCRIBE ORDER (OUTPATIENT)
Dept: SCHEDULING | Age: 57
End: 2022-03-22

## 2022-03-22 ENCOUNTER — TELEPHONE (OUTPATIENT)
Dept: SURGERY | Age: 57
End: 2022-03-22

## 2022-03-22 NOTE — TELEPHONE ENCOUNTER
Still reports dysphagia similar to what he had prior to his thyroidectomy    He never had the video swallow I ordered last fall    He saw Dr Marissa Williamson (ENT) who looked at the cords and they were functioning fine. Dr Marissa Williamson felt he had laryngeal reflux. The patient never filled the omeprazole and still has issues.     He has a planned f/u for the lung nodule and hilar node per Dr Christi Saavedra    I will reorder the video swallow  He will reach out to Dr Marissa Williamson regarding the laryngeal reflux as this is likely playing a role    Yen, please assist with scheduling    Genet Guevara MD FACS

## 2022-03-22 NOTE — TELEPHONE ENCOUNTER
Patient calling in regards to getting a CT order placed. I advised him that I would follow up with Dr Fabien Cooper. Please see previous note.

## 2022-03-22 NOTE — TELEPHONE ENCOUNTER
Patient called to schedule his XR Swallow Function Study. I called scheduling and got him scheduled for 4/7/22.

## 2022-04-07 ENCOUNTER — HOSPITAL ENCOUNTER (OUTPATIENT)
Dept: GENERAL RADIOLOGY | Age: 57
Discharge: HOME | End: 2022-04-07
Attending: SURGERY
Payer: COMMERCIAL

## 2022-04-07 DIAGNOSIS — R13.12 OROPHARYNGEAL DYSPHAGIA: ICD-10-CM

## 2022-04-07 PROCEDURE — 92611 MOTION FLUOROSCOPY/SWALLOW: CPT | Performed by: SPEECH-LANGUAGE PATHOLOGIST

## 2022-04-07 PROCEDURE — 74230 X-RAY XM SWLNG FUNCJ C+: CPT

## 2022-04-07 NOTE — PROGRESS NOTES
[facility]  [address] STUDY  Patient: Jayne Spears (55 y.o. male)  Date: 4/7/2022  Referring Provider:  Dr Hastings Hidden:   Patient reports globus sensation with pills, steak, and shredded chicken at the level of the sternal notch. This has been ongoing since prior to resection of thyroid cancer. Symptoms worsened after surgery but have remained stable since that time. He denies any recent pneumonias. He occasionally chokes on his own saliva. He reports recently being scoped by ENT, who noted no structural abnormalities, but suggested reflux as a contributing factor. The patient denies reflux symptoms. He has not yet started reflux medications, but says he plans to. He also noted a new lump at the site of his surgical scar, which appeared after speaking with his physician about today's test. He plans to follow up with his physician about this. Chest CT planned for later today. OBJECTIVE:   Past Medical History:   Past Medical History:   Diagnosis Date    Asthma     childhood    Colon polyp     COVID-19 11/28/2021    Diabetes ([address]     High cholesterol     Hypertension     Sleep apnea     no cpap    Thyroid cancer [facility])      Past Surgical History:   Procedure Laterality Date    HX COLONOSCOPY      HX ORTHOPAEDIC Right 01/2021    Rt Hip replacement [facility]    HX OTHER SURGICAL  09/01/2021    Total thyroidectomy with limited central neck dissection. Current Dietary Status:  Regular diet with thin liquids, PO medications whole with water  Radiologist: Dr Roverto Rao: Lateral;Fluoro  Patient Position: standing    Trial 1:   Consistency Presented: Thin liquid; Solid;Pudding;Pill/Tablet   How Presented: Self-fed/presented;Spoon;Straw;Successive swallows       Bolus Acceptance: No impairment   Bolus Formation/Control: No impairment:     Propulsion: No impairment   Oral Residue: None   Initiation of Swallow: No impairment   Timing: No impairment   Penetration: Flash/transient;During swallow   Aspiration/Timing: No evidence of aspiration       Attempted Modifications: Alternate liquids/solids   Effective Modifications: Alternate liquids/solids   Cues for Modifications: None           Decreased Tongue Base Retraction?: No  Laryngeal Elevation: Incomplete laryngeal closure  Aspiration/Penetration Score: 2 (Penetration/No residue-Contrast enters the airway penetrates, remains above the folds/cords, and is cleared)  Pharyngeal Symmetry: Not assessed  Pharyngeal-Esophageal Segment: No impairment       Oral Phase Severity: No impairment  Pharyngeal Phase Severity: N/A    ASSESSMENT :  Based on the objective data described above, the patient presents with grossly functional oropharyngeal swallow. There is trace penetration of liquids during the swallow which clears with the force of the swallow. No aspiration noted. (This is a variant of normal and not pathological). A 13 mm barium tablet lodged briefly at the level of the valleculae, but patient cleared independently with a liquid flush. Otherwise, there was no vallecular or pyriform residue noted with any other consistency. Radiologist noted cervical osteophytes which may be contributing to sensation of difficulty swallowing, though they did not appear to obstruct the flow of the bolus through the pharynx. Additionally, patients with reflux tend to have similar symptoms (globus sensation with meats and pills with otherwise normal swallow physiology). PLAN/RECOMMENDATIONS :  Regular diet, thin liquids  If symptoms are bothersome, consider adding moisture to meats (sauce/gravy), consider meds in puree such as applesauce or cut in half if appropriate for that specific medication.    In light of ENT diagnosis of reflux, consider adding reflux precautions along with medical management as suggested by MD   Follow up with outpatient SLP for dysphonia if MD agrees, as patient is also reporting this is a change     COMMUNICATION/EDUCATION:   The above findings and recommendations were discussed with: patient who verbalized understanding.     Thank you for this referral.  ANA Barrett  Time Calculation: 40 mins

## 2022-04-10 LAB
T4 FREE SERPL-MCNC: 1.82 NG/DL (ref 0.82–1.77)
THYROGLOB AB SERPL-ACNC: <1 IU/ML (ref 0–0.9)
THYROGLOB SERPL-MCNC: <0.1 NG/ML (ref 1.4–29.2)
TSH SERPL DL<=0.005 MIU/L-ACNC: 5.38 UIU/ML (ref 0.45–4.5)

## 2022-04-11 ENCOUNTER — TELEPHONE (OUTPATIENT)
Dept: SURGERY | Age: 57
End: 2022-04-11

## 2022-04-11 NOTE — TELEPHONE ENCOUNTER
Video swallow is normal  No penetration or aspiration  Mechanism intact      Favor f/u with ENT      Brittanie Dawson MD FACS

## 2022-04-13 ENCOUNTER — TELEPHONE (OUTPATIENT)
Dept: ENDOCRINOLOGY | Age: 57
End: 2022-04-13

## 2022-04-14 RX ORDER — LEVOTHYROXINE SODIUM 200 UG/1
TABLET ORAL
Qty: 105 TABLET | Refills: 3 | Status: SHIPPED | OUTPATIENT
Start: 2022-04-14 | End: 2022-04-15

## 2022-04-14 RX ORDER — OMEPRAZOLE 40 MG/1
40 CAPSULE, DELAYED RELEASE ORAL DAILY
COMMUNITY

## 2022-04-14 NOTE — TELEPHONE ENCOUNTER
It appears he read my message but never responded over mychart:    TSH is a thyroid test.  Your level is 5.38 which is high and above goal of 0.1-0.5 during the 1st 2 years for someone who has had thyroid cancer surgery.  This test goes opposite of your thyroid dose and suggests your dose of levothyroxine is possibly not enough or you have missed doses in the past 6 weeks or are not taking it properly.  Please confirm if you have missed any doses or not in the past 6 weeks.  Are you taking on an empty stomach with just water at least 30 min before food and coffee?  Are you taking any vitamins within 4 hours of your pill?  Are you on any new acid reflux medications or any other new medications?  Let me know the answers to these questions and I'll determine if we need to make a dose change or not.

## 2022-04-14 NOTE — TELEPHONE ENCOUNTER
I left a message today and patient has sent a response through [address] [place] [address] [address].

## 2022-04-15 RX ORDER — LEVOTHYROXINE SODIUM 200 UG/1
TABLET ORAL
Qty: 35 TABLET | Refills: 11 | Status: SHIPPED | OUTPATIENT
Start: 2022-04-15

## 2022-05-31 DIAGNOSIS — C73 THYROID CANCER (HCC): ICD-10-CM

## 2022-06-16 LAB
T4 FREE SERPL-MCNC: 2.14 NG/DL (ref 0.82–1.77)
TSH SERPL DL<=0.005 MIU/L-ACNC: 0.83 UIU/ML (ref 0.45–4.5)

## 2022-06-23 ENCOUNTER — TELEPHONE (OUTPATIENT)
Dept: ENDOCRINOLOGY | Age: 57
End: 2022-06-23

## 2022-06-23 NOTE — TELEPHONE ENCOUNTER
Sent him the following message through [employer]:    Unknown Catarino from our office let me know she has called you 3 times and has yet to connect with you to reschedule your visit for December. Please call [phone] at your earliest convenience to schedule this appointment. Thanks!

## 2022-06-23 NOTE — TELEPHONE ENCOUNTER
----- Message from [facility] sent at 6/22/2022  1:24 PM EDT -----  Left 3 messages over the course of the last week requesting callback to schedule Dec follow up.  ----- Message -----  From: Corina Hwang MD  Sent: 6/17/2022   6:45 AM EDT  To: [facility]    Please call him to reschedule his 6 month follow up for December for thyroid.

## 2022-12-01 DIAGNOSIS — C73 THYROID CANCER (HCC): ICD-10-CM

## 2022-12-06 ENCOUNTER — OFFICE VISIT (OUTPATIENT)
Dept: SURGERY | Age: 57
End: 2022-12-06
Payer: COMMERCIAL

## 2022-12-06 VITALS
DIASTOLIC BLOOD PRESSURE: 79 MMHG | HEIGHT: 72 IN | SYSTOLIC BLOOD PRESSURE: 137 MMHG | HEART RATE: 86 BPM | WEIGHT: 244.6 LBS | OXYGEN SATURATION: 96 % | RESPIRATION RATE: 18 BRPM | TEMPERATURE: 98 F | BODY MASS INDEX: 33.13 KG/M2

## 2022-12-06 DIAGNOSIS — C73 PAPILLARY CARCINOMA OF THYROID (HCC): ICD-10-CM

## 2022-12-06 DIAGNOSIS — R13.12 OROPHARYNGEAL DYSPHAGIA: Primary | ICD-10-CM

## 2022-12-06 PROCEDURE — 99214 OFFICE O/P EST MOD 30 MIN: CPT | Performed by: SURGERY

## 2022-12-06 RX ORDER — DEXAMETHASONE 4 MG/1
1 TABLET ORAL DAILY
COMMUNITY
Start: 2022-10-20 | End: 2022-12-06

## 2022-12-06 NOTE — PROGRESS NOTES
HISTORY OF PRESENT ILLNESS  Christina Erickson is a 65 y.o. male who comes in for follow up by Geri Nichole MD for cancer  Thyroid Problem  Pertinent negatives include no chest pain, no abdominal pain, no headaches and no shortness of breath. He hadnoted increasing dysphagia over the last 4 years. He has problems swallowing meat and pills at times. He also feels like he is choking when he flexes his neck. He has a remote hx neck trauma from a tubing injury with four fractured cervical vertebra per the patient. In 2018 he had a CT of the neck noting a 5 cm left thyroid nodule and biopsy was recommended but never done. It also noted anterior cervical osteophytosis with some mass effect on the hypopharynx and C5-6 moderate canal stenosis. He denies arm paresthesias/weakness or voice changes, palpitations, unexplained weight loss/gain. FNA of the left thyroid nodule demonstrated papillary carcinoma with cystic features. He underwent a total thyroidectomy with limited central neck dissection for a multifocal 3 cm and 0.2 cm papillary carcinoma with positive lymph node 9/2021. He had been managed by Dr Nathan Philip with last set of labs 6/2022 by Geri Nichole MD with thyroglobulin <0.1. TSH 0.835 and Free T4 2.34.  he had problems with hoarseness,globus sensation and dysphagia post op and was evaluated by ENT and speech without identification of a reason and thought to be due to GERD. He had a video swallow that was normal but was referred to speech therapy and never went. He has not followed up with Dr Liseth Metz in a year.     Past Medical History:   Diagnosis Date    Asthma     childhood    Colon polyp     COVID-19 11/28/2021    Diabetes ([address]     High cholesterol     Hypertension     Sleep apnea     no cpap    Thyroid cancer [facility])      Past Surgical History:   Procedure Laterality Date    HX COLONOSCOPY      HX ORTHOPAEDIC Right 01/2021    Rt Hip replacement [facility]    HX OTHER SURGICAL  09/01/2021    Total thyroidectomy with limited central neck dissection. Family History   Problem Relation Age of Onset    Diabetes Mother     Hypertension Mother     Hypertension Father     Diabetes Father     Heart Disease Father     Stroke Father     Thyroid Cancer Sister      Social History     Tobacco Use    Smoking status: Every Day     Packs/day: 0.25     Years: 40.00     Pack years: 10.00     Types: Cigarettes    Smokeless tobacco: Never    Tobacco comments:     down from 1ppd as of 9/21   Vaping Use    Vaping Use: Never used   Substance Use Topics    Alcohol use: Yes     Comment: 8-10 drinks, weekends    Drug use: Yes     Frequency: 3.0 times per week     Types: Marijuana     Comment: uses 3 times per week, 2 days ago last uses     Current Outpatient Medications   Medication Sig    levothyroxine (SYNTHROID) 200 mcg tablet Take 1 tab on Mon-Sat and 2 tabs on Sun--New higher dose replaces prior script on file    omeprazole (PRILOSEC) 40 mg capsule Take 40 mg by mouth daily. lisinopriL (PRINIVIL, ZESTRIL) 5 mg tablet Take  by mouth daily. dapagliflozin (FARXIGA) 10 mg tab tablet Take  by mouth daily. metFORMIN (GLUCOPHAGE) 1,000 mg tablet Take 1,000 mg by mouth two (2) times daily (with meals). atorvastatin (LIPITOR) 40 mg tablet Take  by mouth daily. (Patient not taking: Reported on 12/6/2022)     No current facility-administered medications for this visit. No Known Allergies    Review of Systems   Constitutional:  Negative for chills, diaphoresis, fever, malaise/fatigue and weight loss. HENT:  Negative for congestion, ear pain and sore throat. Eyes:  Negative for blurred vision and pain. Respiratory:  Negative for cough, hemoptysis, sputum production, shortness of breath, wheezing and stridor. Cardiovascular:  Negative for chest pain, palpitations, orthopnea, claudication, leg swelling and PND.    Gastrointestinal:  Negative for abdominal pain, blood in stool, constipation, diarrhea, heartburn, melena, nausea and vomiting. Genitourinary:  Negative for dysuria, flank pain, frequency, hematuria and urgency. Musculoskeletal:  Positive for back pain and myalgias. Negative for joint pain and neck pain. Skin:  Negative for itching and rash. Neurological:  Positive for sensory change (peripheral neuropathy in feet). Negative for dizziness, tremors, focal weakness, seizures, weakness and headaches. Endo/Heme/Allergies:  Negative for polydipsia. Psychiatric/Behavioral:  Negative for depression and memory loss. The patient is not nervous/anxious. Visit Vitals  /79 (BP 1 Location: Left upper arm, BP Patient Position: Sitting)   Pulse 86   Temp 98 °F (36.7 °C) (Oral)   Resp 18   Ht 6' (1.829 m)   Wt 110.9 kg (244 lb 9.6 oz)   SpO2 96%   BMI 33.17 kg/m²       Physical Exam  Vitals reviewed. Constitutional:       General: He is not in acute distress. Appearance: Normal appearance. He is well-developed. He is not diaphoretic. HENT:      Head: Normocephalic and atraumatic. Mouth/Throat:      Pharynx: No oropharyngeal exudate. Eyes:      General: No scleral icterus. Conjunctiva/sclera: Conjunctivae normal.      Pupils: Pupils are equal, round, and reactive to light. Neck:      Thyroid: No thyroid mass, thyromegaly or thyroid tenderness. Trachea: Trachea normal. No tracheal deviation. Comments: Some dysphonia  Cardiovascular:      Rate and Rhythm: Normal rate and regular rhythm. Heart sounds: Normal heart sounds. No murmur heard. No friction rub. No gallop. Pulmonary:      Effort: Pulmonary effort is normal. No respiratory distress. Breath sounds: Normal breath sounds. No stridor. No wheezing or rales. Abdominal:      General: Bowel sounds are normal. There is no distension. Palpations: Abdomen is soft. There is no mass. Tenderness: There is no abdominal tenderness. There is no guarding or rebound. Hernia: No hernia is present. There is no hernia in the left inguinal area. Genitourinary:     Penis: Circumcised. Testes: Normal. Cremasteric reflex is present. Musculoskeletal:         General: No tenderness. Normal range of motion. Cervical back: Full passive range of motion without pain, normal range of motion and neck supple. Lymphadenopathy:      Cervical: No cervical adenopathy. Right cervical: No superficial or deep cervical adenopathy. Left cervical: No superficial or deep cervical adenopathy. Skin:     General: Skin is warm and dry. Findings: No erythema or rash. Neurological:      Mental Status: He is alert and oriented to person, place, and time. Cranial Nerves: No cranial nerve deficit. Coordination: Coordination normal.   Psychiatric:         Behavior: Behavior normal.         Thought Content: Thought content normal.         Judgment: Judgment normal.       ASSESSMENT and PLAN  1. 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 Dx 9/2021. Check TSH, Free T4 and thyroglobulin levels  Needs to follow up with Dr Anastacia Craig    2. Dysphagia. ?due to #1 or cervical osteophytes. Present prior to thyroid surgery. Will again try to get him in to speech therapy and get another video swallow as well  He may need to go back to ENT  3.   lung nodule on CXR. He has seen Dr Ashley López   PET done 7/20/2021 was low uptake and likely plan for close follow up     4. NIDDM type 2 with peripheral neuropathy  On rx  5. Essential hypertension. Stable on rx  6. Hypercholesterolemia. On statin  7. Tobacco use   1ppd x 35 years. Recommended cessation  8. Dysphonia. Ongoing despite normal cord function  Again consider ENT  9.   Given #2 and 8 would also recommend seeing GI for possible ENT    Soo Choe MD FACS

## 2022-12-06 NOTE — PROGRESS NOTES
Identified pt with two pt identifiers(name and ). Reviewed record in preparation for visit and have obtained necessary documentation. All patient medications has been reviewed. Chief Complaint   Patient presents with    Follow-up     Re evaluate thyroid       Health Maintenance Due   Topic    Hepatitis C Screening     COVID-19 Vaccine (1)    Pneumococcal 0-64 years (1 - PCV)    Lipid Screen     DTaP/Tdap/Td series (1 - Tdap)    Colorectal Cancer Screening Combo     Shingrix Vaccine Age 50> (1 of 2)    Flu Vaccine (1)    Depression Screen        Vitals:    22 1000   BP: 137/79   Pulse: 86   Resp: 18   Temp: 98 °F (36.7 °C)   TempSrc: Oral   SpO2: 96%   Weight: 244 lb 9.6 oz (110.9 kg)   Height: 6' (1.829 m)   PainSc:   0 - No pain       4. Have you been to the ER, urgent care clinic since your last visit? Hospitalized since your last visit? No    5. Have you seen or consulted any other health care providers outside of the [address] [place] [address] since your last visit? Include any pap smears or colon screening. No      Patient is accompanied by self I have received verbal consent from Katherine López to discuss any/all medical information while they are present in the room.

## 2022-12-13 ENCOUNTER — TELEPHONE (OUTPATIENT)
Dept: SURGERY | Age: 57
End: 2022-12-13

## 2022-12-13 NOTE — TELEPHONE ENCOUNTER
Reached out to patient to let him know that I will be in touch with [facility] to complete referral to speech language pathologist. Left message for a return call.

## 2022-12-15 ENCOUNTER — OFFICE VISIT (OUTPATIENT)
Dept: ORTHOPEDIC SURGERY | Age: 57
End: 2022-12-15
Payer: COMMERCIAL

## 2022-12-15 DIAGNOSIS — Z96.641 STATUS POST TOTAL REPLACEMENT OF RIGHT HIP: Primary | ICD-10-CM

## 2022-12-15 NOTE — LETTER
12/15/2022    Patient: Adri Hodges   YOB: 1965   Date of Visit: 12/15/2022     Brigette Medrano, [address] [facility] [address]  Via Fax: [phone]    Dear Brigette Medrano MD,      Thank you for referring Mr. Adri Hodges to [facility] for evaluation. My notes for this consultation are attached. If you have questions, please do not hesitate to call me. I look forward to following your patient along with you.       Sincerely,    Dread Mccartney MD

## 2022-12-15 NOTE — PROGRESS NOTES
Carolyne Louis (: 1965) is a 65 y.o. male, patient, here for evaluation of the following chief complaint(s):  Surgical Follow-up (Right hip cramping around incision site, concerns with leg being shorter than left )       HPI:    Follow-up right total hip. Complains of some occasional crampiness when he is sitting for long period of time. Also leg length discrepancy. No Known Allergies    Current Outpatient Medications   Medication Sig    levothyroxine (SYNTHROID) 200 mcg tablet Take 1 tab on Mon-Sat and 2 tabs on Sun--New higher dose replaces prior script on file    omeprazole (PRILOSEC) 40 mg capsule Take 40 mg by mouth daily. lisinopriL (PRINIVIL, ZESTRIL) 5 mg tablet Take  by mouth daily. dapagliflozin (FARXIGA) 10 mg tab tablet Take  by mouth daily. metFORMIN (GLUCOPHAGE) 1,000 mg tablet Take 1,000 mg by mouth two (2) times daily (with meals). atorvastatin (LIPITOR) 40 mg tablet Take  by mouth daily. (Patient not taking: No sig reported)     No current facility-administered medications for this visit. Past Medical History:   Diagnosis Date    Asthma     childhood    Colon polyp     COVID-19 2021    Diabetes ([facility] [address]     High cholesterol     Hypertension     Sleep apnea     no cpap    Thyroid cancer [facility])         Past Surgical History:   Procedure Laterality Date    HX COLONOSCOPY      HX ORTHOPAEDIC Right 2021    Rt Hip replacement [facility]    HX OTHER SURGICAL  2021    Total thyroidectomy with limited central neck dissection.        Family History   Problem Relation Age of Onset    Diabetes Mother     Hypertension Mother     Hypertension Father     Diabetes Father     Heart Disease Father     Stroke Father     Thyroid Cancer Sister         Social History     Socioeconomic History    Marital status: SINGLE     Spouse name: Not on file    Number of children: Not on file    Years of education: Not on file    Highest education level: Not on file   Occupational History    Not on file   Tobacco Use    Smoking status: Every Day     Packs/day: 0.25     Years: 40.00     Pack years: 10.00     Types: Cigarettes    Smokeless tobacco: Never    Tobacco comments:     down from 1ppd as of 9/21   Vaping Use    Vaping Use: Never used   Substance and Sexual Activity    Alcohol use: Yes     Comment: 8-10 drinks, weekends    Drug use: Yes     Frequency: 3.0 times per week     Types: Marijuana     Comment: uses 3 times per week, 2 days ago last uses    Sexual activity: Not on file   Other Topics Concern    Not on file   Social History Narrative    Lives in [place] alone. No kids. 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 Likes to golf and fish. Social Determinants of Health     Financial Resource Strain: Not on file   Food Insecurity: Not on file   Transportation Needs: Not on file   Physical Activity: Not on file   Stress: Not on file   Social Connections: Not on file   Intimate Partner Violence: Not on file   Housing Stability: Not on file       ROS    Positive for: Musculoskeletal  Last edited by Gladis Hart on 12/15/2022  3:56 PM.            Vitals: There were no vitals taken for this visit. There is no height or weight on file to calculate BMI. PHYSICAL EXAM:  On exam without issue left honestly I do not notice much clinical leg length discrepancy. He wears 1/4 inch in his shoe because he states that he drags his heel a lot on the operative side. Incision is well-healed. No hip flexor or abductor weakness. IMAGING:  XR Results (most recent):  Results from Appointment encounter on 12/15/22    XR HIP RT W OR WO PELV 2-3 VWS    Narrative  Right hip 2 views. Implant is well fixed to the bone without issue. ASSESSMENT/PLAN:  1. Status post total replacement of right hip  -     XR HIP RT W OR WO PELV 2-3 VWS; Future    Implant is well fixed. May be slight leg length discrepancy but I think this could be corrected with an over the [address] lift.   When he is walking around during the day he actually does not feel like he has any leg length discrepancy with his shoes off. Radiographically no more than 1/4 inch shorter on the right side. Cannot explain the cramping. May be coming from something else. Follow-up in about 5 years. After long discussion patient actually stated that his hip feels much better than it did prior to surgery. An electronic signature was used to authenticate this note.   --Derek Doty MD

## 2022-12-29 ENCOUNTER — HOSPITAL ENCOUNTER (OUTPATIENT)
Dept: GENERAL RADIOLOGY | Age: 57
Discharge: HOME | End: 2022-12-29
Attending: SURGERY
Payer: COMMERCIAL

## 2022-12-29 DIAGNOSIS — R13.12 OROPHARYNGEAL DYSPHAGIA: ICD-10-CM

## 2022-12-29 DIAGNOSIS — C73 PAPILLARY CARCINOMA OF THYROID (HCC): ICD-10-CM

## 2022-12-29 PROCEDURE — 92611 MOTION FLUOROSCOPY/SWALLOW: CPT

## 2022-12-29 PROCEDURE — 74230 X-RAY XM SWLNG FUNCJ C+: CPT

## 2022-12-29 NOTE — PROGRESS NOTES
[address] STUDY  Patient: Shakir Cuevas [de-identified]65 y.o. male)  Date: 12/29/2022  Referring Provider:  Dr. Mohsen Orona:   Patient presents with continued complaints of feeling like pills and shredded meats are getting stuck that has been ongoing since before his resection of thyroid cancer and worsened after surgery. Patient with recent MBS completed for same complaint revealing normal oropharyngeal swallow with presence of osteophyte likely contributing to globus sensation. Since then patient reports symptoms have been consistent. He denies any coughing or choking with liquids. He denies any respiratory infections or pneumonias. Patient also reports continued dysphonia (hoarse, raspy quality). OBJECTIVE:   Past Medical History:   Past Medical History:   Diagnosis Date    Asthma     childhood    Colon polyp     COVID-19 11/28/2021    Diabetes ([address]     High cholesterol     Hypertension     Sleep apnea     no cpap    Thyroid cancer [facility])      Past Surgical History:   Procedure Laterality Date    HX COLONOSCOPY      HX ORTHOPAEDIC Right 01/2021    Rt Hip replacement [facility]    HX OTHER SURGICAL  09/01/2021    Total thyroidectomy with limited central neck dissection. Current Dietary Status:  regular/thin  Radiologist:  (Dr. Liz York)  Film Views: Lateral;Fluoro  Patient Position: standing    Trial 1: Trial 2:   Consistency Presented:  Thin liquid Consistency Presented: Puree   How Presented: Straw;Successive swallows How Presented: Self-fed/presented   Consistency Amount:  (60cc) Consistency Amount:  (1 tbs)   Bolus Acceptance: No impairment Bolus Acceptance: No impairment   Bolus Formation/Control: No impairment:   Bolus Formation/Control: No impairment:     Propulsion: No impairment Propulsion: No impairment   Oral Residue: None Oral Residue: None   Initiation of Swallow: No impairment Initiation of Swallow: Triggered at valleculae   Timing: No impairment Timing: No impairment   Penetration: None Penetration: None   Aspiration/Timing: No evidence of aspiration Aspiration/Timing: No evidence of aspiration   Pharyngeal Clearance: No residue Pharyngeal Clearance: No residue                             Trial 3: Trial 4:   Consistency Presented: Solid Consistency Presented: All consistencies   How Presented: Self-fed/presented How Presented: Self-fed/presented   Consistency Amount:  (bite of edilberto cracker with ba paste)     Bolus Acceptance: No impairment Bolus Acceptance: Impaired   Bolus Formation/Control: No impairment, issues, or problems :   Bolus Formation/Control: Impaired:     Propulsion: No impairment Propulsion: No impairment   Oral Residue: None Oral Residue: None   Initiation of Swallow: Triggered at valleculae    Timing: No impairment Timing: No impairment   Penetration: None Penetration: None   Aspiration/Timing: No evidence of aspiration Aspiration/Timing: No evidence of aspiration   Pharyngeal Clearance: Pyriform residue ;Vallecular residue;10-50% Pharyngeal Clearance: No residue   Attempted Modifications: Alternate liquids/solids; Double swallow;Effortful swallow     Effective Modifications: Alternate liquids/solids                   Decreased Tongue Base Retraction?: No  Laryngeal Elevation: WFL (within functional limits)  Aspiration/Penetration Score: 1 (No penetration or aspiration-Contrast does not enter the airway)  Pharyngeal Symmetry: Symmetrical  Pharyngeal-Esophageal Segment: Decreased relaxation of upper esophageal segment       Oral Phase Severity: No impairment  Pharyngeal Phase Severity: N/A    ASSESSMENT :  Based on the objective data described above, the patient presents with functional oropharyngeal swallow. Oral phase is characterized by good bolus formation and control with timely and efficient posterior propulsion.  No significant oral residue noted with any consistencies. Pharyngeal phase characterized by timely swallow initiation with adequate airway protection. Patient with intermittent flash penetration with liquids that cleared with the force of the swallow [facility]). 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 Liquid washed and cued effortful swallows aided in clearing some but not all of the vallecular residue. 13mm barium tablet got stuck in vallecula and patient then coughed it up reporting he was unable to swallow it. With tablet cut in half, it passed clearly through oropharynx. Given results of study suspect that globus sensation with dry solids and pills getting stuck are due to cervical osteophyte impacting epiglottic inversion. Therefore recommend continue with regular diet/thin liquids with precautions as outlined below. PLAN/RECOMMENDATIONS :  -- regular diet/thin liquids  -- small bites  -- alternate bites and sips to clear residue  -- consider adding extra moisture (sauces, gravies) to dry solids  -- recommend meds in applesauce or pudding  -- Follow up with OP SLP for continued dysphonia     COMMUNICATION/EDUCATION:   The above findings and recommendations were discussed with:  the patient  who verbalized understanding and will be faxed to PCP and referring provider.     Thank you for this referral.  Afsaneh Fay M.S. [facility] Pathologist     Time Calculation: 30 mins

## 2023-01-04 ENCOUNTER — TELEPHONE (OUTPATIENT)
Dept: SURGERY | Age: 58
End: 2023-01-04

## 2023-01-04 NOTE — TELEPHONE ENCOUNTER
Reviewed SLP notes and MBS    He has some cervical spine osteophytes that are likely the source of his dysphagia.     Favor observation vs ENT eval for the dysphagia      He also has the voice issues and SLP will work with him on that    Left message    Mildred Salamanca MD FACS

## 2023-05-19 RX ORDER — ATORVASTATIN CALCIUM 40 MG/1
TABLET, FILM COATED ORAL DAILY
COMMUNITY

## 2023-05-19 RX ORDER — LISINOPRIL 5 MG/1
TABLET ORAL DAILY
COMMUNITY

## 2023-05-19 RX ORDER — LEVOTHYROXINE SODIUM 0.2 MG/1
TABLET ORAL
COMMUNITY
Start: 2022-04-15

## 2023-05-19 RX ORDER — OMEPRAZOLE 40 MG/1
40 CAPSULE, DELAYED RELEASE ORAL DAILY
COMMUNITY

## 2023-09-21 ENCOUNTER — APPOINTMENT (OUTPATIENT)
Facility: HOSPITAL | Age: 58
End: 2023-09-21
Attending: INTERNAL MEDICINE
Payer: COMMERCIAL

## 2023-09-21 ENCOUNTER — HOSPITAL ENCOUNTER (OUTPATIENT)
Facility: HOSPITAL | Age: 58
Discharge: HOME | End: 2023-09-21
Attending: INTERNAL MEDICINE
Payer: COMMERCIAL

## 2023-09-21 DIAGNOSIS — C73 THYROID CANCER (HCC): ICD-10-CM

## 2023-09-21 PROCEDURE — 70491 CT SOFT TISSUE NECK W/DYE: CPT

## 2023-09-21 PROCEDURE — 6360000004 HC RX CONTRAST MEDICATION: Performed by: INTERNAL MEDICINE

## 2023-09-21 PROCEDURE — 71260 CT THORAX DX C+: CPT

## 2023-09-21 RX ADMIN — IOPAMIDOL 100 ML: 755 INJECTION, SOLUTION INTRAVENOUS at 08:40

## 2024-08-21 ENCOUNTER — APPOINTMENT (OUTPATIENT)
Facility: HOSPITAL | Age: 59
End: 2024-08-21
Attending: INTERNAL MEDICINE
Payer: COMMERCIAL

## 2024-08-21 ENCOUNTER — HOSPITAL ENCOUNTER (OUTPATIENT)
Facility: HOSPITAL | Age: 59
Discharge: HOME | End: 2024-08-24
Attending: INTERNAL MEDICINE
Payer: COMMERCIAL

## 2024-08-21 DIAGNOSIS — Z87.891 PERSONAL HISTORY OF NICOTINE DEPENDENCE: ICD-10-CM

## 2024-08-21 DIAGNOSIS — C73 THYROID CANCER (HCC): ICD-10-CM

## 2024-08-21 PROCEDURE — 71271 CT THORAX LUNG CANCER SCR C-: CPT

## 2024-08-21 PROCEDURE — 6360000004 HC RX CONTRAST MEDICATION: Performed by: INTERNAL MEDICINE

## 2024-08-21 PROCEDURE — 70491 CT SOFT TISSUE NECK W/DYE: CPT

## 2024-08-21 RX ADMIN — IOPAMIDOL 100 ML: 755 INJECTION, SOLUTION INTRAVENOUS at 08:31

## 2025-07-16 ENCOUNTER — TRANSCRIBE ORDERS (OUTPATIENT)
Facility: HOSPITAL | Age: 60
End: 2025-07-16

## 2025-07-16 DIAGNOSIS — Z87.891 PERSONAL HISTORY OF TOBACCO USE: Primary | ICD-10-CM

## 2025-07-16 DIAGNOSIS — F17.210 CIGARETTE SMOKER: ICD-10-CM

## 2025-07-19 ENCOUNTER — TRANSCRIBE ORDERS (OUTPATIENT)
Facility: HOSPITAL | Age: 60
End: 2025-07-19

## 2025-07-19 DIAGNOSIS — R59.0 MEDIASTINAL ADENOPATHY: Primary | ICD-10-CM

## 2025-08-11 ENCOUNTER — HOSPITAL ENCOUNTER (OUTPATIENT)
Facility: HOSPITAL | Age: 60
Discharge: HOME | End: 2025-08-14
Attending: INTERNAL MEDICINE
Payer: COMMERCIAL

## 2025-08-11 DIAGNOSIS — R59.0 MEDIASTINAL ADENOPATHY: ICD-10-CM

## 2025-08-11 LAB — CREAT BLD-MCNC: 0.9 MG/DL (ref 0.6–1.3)

## 2025-08-11 PROCEDURE — 71260 CT THORAX DX C+: CPT

## 2025-08-11 PROCEDURE — 6360000004 HC RX CONTRAST MEDICATION: Performed by: INTERNAL MEDICINE

## 2025-08-11 PROCEDURE — 82565 ASSAY OF CREATININE: CPT

## 2025-08-11 RX ORDER — IOPAMIDOL 755 MG/ML
100 INJECTION, SOLUTION INTRAVASCULAR
Status: COMPLETED | OUTPATIENT
Start: 2025-08-11 | End: 2025-08-11

## 2025-08-11 RX ADMIN — IOPAMIDOL 100 ML: 755 INJECTION, SOLUTION INTRAVENOUS at 16:12

## (undated) DEVICE — SUTURE VCRL SZ 3-0 L54IN ABSRB VLT LIGAPAK REEL NDL J205G

## (undated) DEVICE — SUT SLK 2-0SH 30IN BLK --

## (undated) DEVICE — MAGNETIC INSTR DRAPE 20X16: Brand: MEDLINE INDUSTRIES, INC.

## (undated) DEVICE — SPONGE HEMOSTAT CELLULS 4X8IN -- SURGICEL

## (undated) DEVICE — NEEDLE HYPO 25GA L1.5IN BVL ORIENTED ECLIPSE

## (undated) DEVICE — SUTURE VCRL SZ 4-0 L18IN ABSRB UD L13MM P-3 3/8 CIR PRIM J494H

## (undated) DEVICE — GARMENT,MEDLINE,DVT,INT,CALF,MED, GEN2: Brand: MEDLINE

## (undated) DEVICE — CLIP LIG M BLU TI HRT SHP WIRE HORZ 600 PER BX

## (undated) DEVICE — KIT,1200CC CANISTER,3/16"X6' TUBING: Brand: MEDLINE INDUSTRIES, INC.

## (undated) DEVICE — SOLUTION IRRIG 1000ML 0.9% SOD CHL USP POUR PLAS BTL

## (undated) DEVICE — SUTURE VCRL SZ 3-0 L27IN ABSRB UD L26MM SH 1/2 CIR J416H

## (undated) DEVICE — SUTURE VCRL SZ 2-0 L27IN ABSRB UD L26MM SH 1/2 CIR J417H

## (undated) DEVICE — INSULATED BLADE ELECTRODE: Brand: EDGE

## (undated) DEVICE — SYR 10ML LUER LOK 1/5ML GRAD --

## (undated) DEVICE — SHEET,T,THYROID,STERILE: Brand: MEDLINE

## (undated) DEVICE — GLOVE ORANGE PI 7 1/2   MSG9075

## (undated) DEVICE — GOWN,SIRUS,NONRNF,SETINSLV,2XL,18/CS: Brand: MEDLINE

## (undated) DEVICE — MAJOR LAPAROTOMY-MRMC: Brand: MEDLINE INDUSTRIES, INC.

## (undated) DEVICE — APPLICATOR MEDICATED 10.5 CC SOLUTION HI LT ORNG CHLORAPREP

## (undated) DEVICE — SUTURE PERMAHAND SZ 2-0 L30IN NONABSORBABLE BLK SILK W/O A305H

## (undated) DEVICE — DERMABOND SKIN ADH 0.7ML -- DERMABOND ADVANCED 12/BX

## (undated) DEVICE — CLIP INT SM WIDE RED TI TRNSVRS GRV CHEVRON SHP W PRECIS